# Patient Record
Sex: FEMALE | Race: WHITE | NOT HISPANIC OR LATINO | Employment: OTHER | URBAN - METROPOLITAN AREA
[De-identification: names, ages, dates, MRNs, and addresses within clinical notes are randomized per-mention and may not be internally consistent; named-entity substitution may affect disease eponyms.]

---

## 2017-02-01 ENCOUNTER — OFFICE VISIT - RIVER FALLS (OUTPATIENT)
Dept: FAMILY MEDICINE | Facility: CLINIC | Age: 57
End: 2017-02-01

## 2017-02-01 ASSESSMENT — MIFFLIN-ST. JEOR: SCORE: 1098.61

## 2017-04-20 ENCOUNTER — OFFICE VISIT - RIVER FALLS (OUTPATIENT)
Dept: FAMILY MEDICINE | Facility: CLINIC | Age: 57
End: 2017-04-20

## 2017-04-20 ASSESSMENT — MIFFLIN-ST. JEOR: SCORE: 1127.64

## 2017-05-16 ENCOUNTER — OFFICE VISIT - RIVER FALLS (OUTPATIENT)
Dept: FAMILY MEDICINE | Facility: CLINIC | Age: 57
End: 2017-05-16

## 2018-03-16 ENCOUNTER — OFFICE VISIT - RIVER FALLS (OUTPATIENT)
Dept: FAMILY MEDICINE | Facility: CLINIC | Age: 58
End: 2018-03-16

## 2019-04-17 ENCOUNTER — OFFICE VISIT - RIVER FALLS (OUTPATIENT)
Dept: FAMILY MEDICINE | Facility: CLINIC | Age: 59
End: 2019-04-17

## 2019-04-17 ASSESSMENT — MIFFLIN-ST. JEOR: SCORE: 1120.38

## 2019-04-25 ENCOUNTER — AMBULATORY - RIVER FALLS (OUTPATIENT)
Dept: FAMILY MEDICINE | Facility: CLINIC | Age: 59
End: 2019-04-25

## 2019-04-25 LAB — HEMOCCULT STL QL IA: NEGATIVE

## 2019-08-12 ENCOUNTER — OFFICE VISIT - RIVER FALLS (OUTPATIENT)
Dept: FAMILY MEDICINE | Facility: CLINIC | Age: 59
End: 2019-08-12

## 2019-08-12 ASSESSMENT — MIFFLIN-ST. JEOR: SCORE: 1101.33

## 2019-09-25 ENCOUNTER — OFFICE VISIT - RIVER FALLS (OUTPATIENT)
Dept: FAMILY MEDICINE | Facility: CLINIC | Age: 59
End: 2019-09-25

## 2020-06-02 ENCOUNTER — TRANSFERRED RECORDS (OUTPATIENT)
Dept: HEALTH INFORMATION MANAGEMENT | Facility: CLINIC | Age: 60
End: 2020-06-02

## 2020-11-30 ENCOUNTER — OFFICE VISIT - RIVER FALLS (OUTPATIENT)
Dept: FAMILY MEDICINE | Facility: CLINIC | Age: 60
End: 2020-11-30

## 2020-11-30 ASSESSMENT — MIFFLIN-ST. JEOR: SCORE: 1116.75

## 2020-12-01 ENCOUNTER — COMMUNICATION - RIVER FALLS (OUTPATIENT)
Dept: FAMILY MEDICINE | Facility: CLINIC | Age: 60
End: 2020-12-01

## 2020-12-01 LAB
CHOLEST SERPL-MCNC: 237 MG/DL
CHOLEST/HDLC SERPL: 2.7 {RATIO}
HDLC SERPL-MCNC: 87 MG/DL
LDLC SERPL CALC-MCNC: 135 MG/DL
NONHDLC SERPL-MCNC: 150 MG/DL
TRIGL SERPL-MCNC: 62 MG/DL

## 2020-12-02 ENCOUNTER — COMMUNICATION - RIVER FALLS (OUTPATIENT)
Dept: FAMILY MEDICINE | Facility: CLINIC | Age: 60
End: 2020-12-02

## 2020-12-02 LAB — HPV ABSTRACT: NORMAL

## 2020-12-16 ENCOUNTER — AMBULATORY - RIVER FALLS (OUTPATIENT)
Dept: FAMILY MEDICINE | Facility: CLINIC | Age: 60
End: 2020-12-16

## 2020-12-17 ENCOUNTER — COMMUNICATION - RIVER FALLS (OUTPATIENT)
Dept: FAMILY MEDICINE | Facility: CLINIC | Age: 60
End: 2020-12-17

## 2020-12-17 LAB — FECAL FAT, QUALITATIVE: NORMAL

## 2020-12-28 ENCOUNTER — COMMUNICATION - RIVER FALLS (OUTPATIENT)
Dept: FAMILY MEDICINE | Facility: CLINIC | Age: 60
End: 2020-12-28

## 2021-04-15 ENCOUNTER — AMBULATORY - RIVER FALLS (OUTPATIENT)
Dept: FAMILY MEDICINE | Facility: CLINIC | Age: 61
End: 2021-04-15

## 2021-05-13 ENCOUNTER — AMBULATORY - RIVER FALLS (OUTPATIENT)
Dept: FAMILY MEDICINE | Facility: CLINIC | Age: 61
End: 2021-05-13

## 2021-07-25 ENCOUNTER — NURSE TRIAGE (OUTPATIENT)
Dept: NURSING | Facility: CLINIC | Age: 61
End: 2021-07-25

## 2021-07-25 NOTE — TELEPHONE ENCOUNTER
Ear congestion and plugged feeling in the right ear noticed it 2 weeks ago and then went away. Yawning and swallowing does not help. Has seasonal allergies. Not tried any medication. NO drainage from the nose or ear. Stated she has past nasal drip. Scratchy throat comes and goes. NO fever. NO cough.  Home care suggestions reviewed with patient per RN protocol.   Advised to make an appointment with provider.        COVID 19 Nurse Triage Plan/Patient Instructions    Please be aware that novel coronavirus (COVID-19) may be circulating in the community. If you develop symptoms such as fever, cough, or SOB or if you have concerns about the presence of another infection including coronavirus (COVID-19), please contact your health care provider or visit [email or web address].     Disposition/Instructions    In-Person Visit with provider recommended. Reference Visit Selection Guide.    Thank you for taking steps to prevent the spread of this virus.  o Limit your contact with others.  o Wear a simple mask to cover your cough.  o Wash your hands well and often.    Resources    [facility]: About COVID-19: [email or web address]/    CDC: What to Do If You're Sick: www.cdc.gov/coronavirus/2019-ncov/about/steps-when-sick.html    CDC: Ending Home Isolation: www.cdc.gov/coronavirus/2019-ncov/hcp/disposition-in-home-patients.html     CDC: Caring for Someone: www.cdc.gov/coronavirus/2019-ncov/if-you-are-sick/care-for-someone.html     [facility]: Interim Guidance for Hospital Discharge to Home: www.health.[facility].mn.us/diseases/coronavirus/hcp/hospdischarge.pdf    [facility] clinical trials (COVID-19 research studies): clinicalaffairs.[facility]/um-clinical-trials     Below are the COVID-19 hotlines at the [place] Department of Health ([facility]). Interpreters are available.   o For health questions: Call 298-395-6376 or 1-535.627.2759 (7 a.m. to 7 p.m.)  o For questions about schools and childcare: Call  602.273.6455 or 1-524.171.3138 (7 a.m. to 7 p.m.)                   Advised an appointment to be seen within 3 days.    Lillian Bernal RN on 7/25/2021 at 9:48 AM      Reason for Disposition    Ear congestion present > 48 hours    Additional Information    Negative: Ear pain is main symptom    Negative: Hearing loss (complete or partial) is main symptom    Negative: Earwax is the main concern    Negative: [1] Has nasal allergies AND [2] they are acting up    Negative: Earache persists > 1 hour    Negative: Pus or cloudy discharge from ear canal    Protocols used: EAR - CONGESTION-A-AH

## 2021-07-26 ENCOUNTER — OFFICE VISIT - RIVER FALLS (OUTPATIENT)
Dept: FAMILY MEDICINE | Facility: CLINIC | Age: 61
End: 2021-07-26

## 2021-12-30 ENCOUNTER — RECORDS - RIVER FALLS (OUTPATIENT)
Dept: FAMILY MEDICINE | Facility: CLINIC | Age: 61
End: 2021-12-30

## 2022-01-26 ENCOUNTER — COMMUNICATION - RIVER FALLS (OUTPATIENT)
Dept: FAMILY MEDICINE | Facility: CLINIC | Age: 62
End: 2022-01-26
Payer: COMMERCIAL

## 2022-01-27 ENCOUNTER — OFFICE VISIT - RIVER FALLS (OUTPATIENT)
Dept: FAMILY MEDICINE | Facility: CLINIC | Age: 62
End: 2022-01-27
Payer: COMMERCIAL

## 2022-01-27 ENCOUNTER — AMBULATORY - RIVER FALLS (OUTPATIENT)
Dept: FAMILY MEDICINE | Facility: CLINIC | Age: 62
End: 2022-01-27
Payer: COMMERCIAL

## 2022-01-28 LAB — FECAL FAT, QUALITATIVE: NORMAL

## 2022-01-31 ENCOUNTER — COMMUNICATION - RIVER FALLS (OUTPATIENT)
Dept: FAMILY MEDICINE | Facility: CLINIC | Age: 62
End: 2022-01-31
Payer: COMMERCIAL

## 2022-02-12 VITALS
HEART RATE: 67 BPM | SYSTOLIC BLOOD PRESSURE: 126 MMHG | HEIGHT: 65 IN | DIASTOLIC BLOOD PRESSURE: 78 MMHG | DIASTOLIC BLOOD PRESSURE: 78 MMHG | SYSTOLIC BLOOD PRESSURE: 142 MMHG | BODY MASS INDEX: 20.89 KG/M2 | TEMPERATURE: 97 F | OXYGEN SATURATION: 97 % | WEIGHT: 125.4 LBS

## 2022-02-12 VITALS
WEIGHT: 123.2 LBS | OXYGEN SATURATION: 98 % | HEART RATE: 60 BPM | TEMPERATURE: 97.3 F | DIASTOLIC BLOOD PRESSURE: 74 MMHG | SYSTOLIC BLOOD PRESSURE: 128 MMHG

## 2022-02-12 VITALS
WEIGHT: 125.3 LBS | SYSTOLIC BLOOD PRESSURE: 134 MMHG | HEART RATE: 76 BPM | DIASTOLIC BLOOD PRESSURE: 68 MMHG | TEMPERATURE: 98.2 F

## 2022-02-12 VITALS
WEIGHT: 121.4 LBS | BODY MASS INDEX: 20.23 KG/M2 | SYSTOLIC BLOOD PRESSURE: 138 MMHG | HEIGHT: 65 IN | TEMPERATURE: 102 F | OXYGEN SATURATION: 95 % | DIASTOLIC BLOOD PRESSURE: 82 MMHG | HEART RATE: 92 BPM

## 2022-02-12 VITALS
HEART RATE: 60 BPM | HEIGHT: 65 IN | WEIGHT: 126.2 LBS | BODY MASS INDEX: 21.02 KG/M2 | TEMPERATURE: 97.2 F | SYSTOLIC BLOOD PRESSURE: 102 MMHG | DIASTOLIC BLOOD PRESSURE: 60 MMHG

## 2022-02-12 VITALS
DIASTOLIC BLOOD PRESSURE: 64 MMHG | HEIGHT: 65 IN | BODY MASS INDEX: 21.29 KG/M2 | HEART RATE: 60 BPM | WEIGHT: 127.8 LBS | TEMPERATURE: 97.6 F | SYSTOLIC BLOOD PRESSURE: 112 MMHG

## 2022-02-12 VITALS
HEIGHT: 65 IN | WEIGHT: 122 LBS | DIASTOLIC BLOOD PRESSURE: 74 MMHG | SYSTOLIC BLOOD PRESSURE: 118 MMHG | HEART RATE: 64 BPM | TEMPERATURE: 97.5 F | BODY MASS INDEX: 20.33 KG/M2

## 2022-02-15 NOTE — TELEPHONE ENCOUNTER
---------------------  From: Eliz Lamas MD   Sent: 12/7/2020 4:50:24 PM CST  Subject: lymph node biopsy results     Called patient at 444pm. Lymph node biopsy from axilla was benign. Reassuring findings. She should call back if questions.

## 2022-02-15 NOTE — NURSING NOTE
Comprehensive Intake Entered On:  8/12/2019 3:03 PM CDT    Performed On:  8/12/2019 3:01 PM CDT by Reta Tran CMA               Summary   Chief Complaint :   c/o fatigue, loss of voice, slight sore throat, mild cough x 4-5 days   Menstrual Status :   Postmenopausal   Weight Measured :   122 lb(Converted to: 122 lb 0 oz, 55.34 kg)    Height Measured :   64.5 in(Converted to: 5 ft 4 in, 163.83 cm)    Body Mass Index :   20.62 kg/m2   Body Surface Area :   1.59 m2   Systolic Blood Pressure :   118 mmHg   Diastolic Blood Pressure :   74 mmHg   Mean Arterial Pressure :   89 mmHg   Peripheral Pulse Rate :   64 bpm   BP Site :   Right arm   Pulse Site :   Radial artery   BP Method :   Manual   HR Method :   Manual   Temperature Temporal :   97.5 DegF(Converted to: 36.4 DegC)    Race :      Languages :   English   Ethnicity :   Not  or    Reta Tran CMA - 8/12/2019 3:01 PM CDT   Health Status   Allergies Verified? :   Yes   Medication History Verified? :   Yes   Immunizations Current :   Yes   Medical History Verified? :   Yes   Pre-Visit Planning Status :   Completed   Tobacco Use? :   Never smoker   Reta Tran CMA - 8/12/2019 3:01 PM CDT   Consents   Consent for Immunization Exchange :   Consent Granted   Consent for Immunizations to Providers :   Consent Granted   Reta Tran CMA - 8/12/2019 3:01 PM CDT   Meds / Allergies   (As Of: 8/12/2019 3:03:46 PM CDT)   Allergies (Active)   No Known Medication Allergies  Estimated Onset Date:   Unspecified ; Created By:   Ximena Prescott MA; Reaction Status:   Active ; Category:   Drug ; Substance:   No Known Medication Allergies ; Type:   Allergy ; Updated By:   Ximena Prescott MA; Reviewed Date:   8/12/2019 3:02 PM CDT        Medication List   (As Of: 8/12/2019 3:03:46 PM CDT)   No Known Home Medications     Reta Tran CMA - 8/12/2019 3:02:32 PM

## 2022-02-15 NOTE — NURSING NOTE
Comprehensive Intake Entered On:  11/30/2020 9:09 AM CST    Performed On:  11/30/2020 9:06 AM CST by Iens Flor CMA               Summary   Chief Complaint :   annual exam- preventative handout reviewed   Menstrual Status :   Postmenopausal   Weight Measured :   125.4 lb(Converted to: 125 lb 6 oz, 56.880 kg)    Height Measured :   64.5 in(Converted to: 5 ft 4 in, 163.83 cm)    Body Mass Index :   21.19 kg/m2   Body Surface Area :   1.61 m2   Systolic Blood Pressure :   142 mmHg (HI)    Diastolic Blood Pressure :   78 mmHg   Mean Arterial Pressure :   99 mmHg   Peripheral Pulse Rate :   67 bpm   Temperature Tympanic :   97 DegF(Converted to: 36.1 DegC)  (LOW)    Oxygen Saturation :   97 %   Race :      Languages :   English   Ethnicity :   Not  or    Ines Flor CMA - 11/30/2020 9:06 AM CST   Health Status   Allergies Verified? :   Yes   Medication History Verified? :   Yes   Immunizations Current :   Yes   Medical History Verified? :   Yes   Pre-Visit Planning Status :   Completed   Tobacco Use? :   Never smoker   Ines Flor CMA - 11/30/2020 9:06 AM CST   ID Risk Screen   Recent Travel History :   No recent travel   Family Member Travel History :   No recent travel   Other Exposure to Infectious Disease :   Unknown   Ines Flor CMA - 11/30/2020 9:06 AM CST   Social History   Social History   (As Of: 11/30/2020 9:09:50 AM CST)   Alcohol:  Current      Occasional   (Last Updated: 12/1/2010 1:43:53 PM CST by Izzy Calle)   1-2 times per month, 2 drinks/episode average.   (Last Updated: 4/20/2017 10:51:07 AM CDT by Shanda Leon CMA)          Tobacco:  Denies Tobacco Use      Never smoker   (Last Updated: 6/9/2017 11:54:19 AM CDT by Izzy Calle)   Never (less than 100 in lifetime)   (Last Updated: 11/30/2020 9:06:43 AM CST by Ines Flor CMA)          Electronic Cigarette/Vaping:        Electronic Cigarette Use: Never.   (Last Updated: 11/30/2020 9:06:47 AM CST by Chacho SNYDER  Ines)          Substance Abuse:  Denies Substance Abuse      Never   (Last Updated: 6/9/2017 11:54:24 AM CDT by Izzy Calle)          Employment/School:        Employed   Comments:  5/20/2011 12:19 PM - [employer]    (Last Updated: 5/20/2011 12:19:36 PM CDT by Shelby Shields)          Home/Environment:        Marital status: .  Spouse/Partner name: Jed Bauman.   (Last Updated: 12/17/2013 2:34:01 PM CST by Lima Lazo CMA)          Nutrition/Health:        Type of diet: Regular.   (Last Updated: 1/20/2016 2:14:51 PM CST by Stephanie Alfonso LPN)          Exercise:  Regular exercise      (Last Updated: 1/20/2016 2:15:00 PM CST by Stephanie Alfonso LPN )         Sexual:        Sexually active: Yes.  Sexual orientation: Heterosexual.   (Last Updated: 6/9/2017 11:54:03 AM CDT by Izzy Calle)          Other:        First menses age 11.  Irregular menses.   (Last Updated: 6/9/2017 11:53:29 AM CDT by Izzy Calle)

## 2022-02-15 NOTE — LETTER
(Inserted Image. Unable to display)   March 27, 2019      PHILIPPE ERNST   [place], [place] [id number]        Dear PHILIPPE,      Thank you for selecting [facility] (previously [facility]) for your healthcare needs.     Our records indicate you are due for the following services:     Annual Physical    To schedule an appointment or if you have further questions, please contact your primary clinic:   [facility]          [phone]   [facility]    [phone]             [facility]         [phone]      Powered by [employer]    Sincerely,    Eliz Lamas M.D.

## 2022-02-15 NOTE — TELEPHONE ENCOUNTER
---------------------  From: Akshat Castrejon   To: Jarrod Pascual PA-C;     Sent: 9/25/2019 2:40:31 PM CDT  Subject: scheduling management     Cat called, foot feeling better is going to cancel appt w you this afternoon.---------------------  From: Jarrod Pascual PA-C   To: Akshat Castrejon;     Sent: 9/25/2019 2:42:13 PM CDT  Subject: RE: scheduling management     [place]    K[facility]

## 2022-02-15 NOTE — PROGRESS NOTES
Patient:   PHILIPPE BOYD            MRN: [id number]            FIN: [id number]               Age:   57 years     Sex:  Female     :  1960   Associated Diagnoses:   Fatigue   Author:   Eliz Lamas MD      Chief Complaint   3/16/2018 1:26 PM CDT    c/o Fatigue x 1 day, concerned about flu  (Modified)      History of Present Illness   Patient with fatigue, feeling run down. Had to nap yesterday.  No known fevers. No congestion or cough. Not nauseous.  Felt like this last year with influenza, although did have fever.  Requesting testing for flu      Review of Systems   Constitutional:  Negative except as documented in history of present illness.    Respiratory:  Negative.    Cardiovascular:  Negative.    Gastrointestinal:  Negative.       Health Status   Allergies:    Allergic Reactions (All)  No known allergies   Problem list:    All Problems  Menarche / ICD-9-CM V21.8 / Confirmed      Histories   Family History:    Kidney stone  Mother  Hypertension  Mother  Heart disease  Mother  Comments:  12/10/2013 3:07 PM - Karen Haskins  Onset late 50's or early 60's. Had diabetes many yrs before.  Diabetes mellitus type 2  Mother  Myocardial infarction  Grandmother (M)  Father  Smoker  Brother  Angioplasty  Brother: onset at 42 .  Cancer of prostate  Father  Esophageal tear  Father  MI (myocardial infarction).  Father  Comments:  12/10/2013 3:07 PM - Karen Haskins  Late 50's or early 60's.     Procedure history:    HPV - Human papillomavirus test negative ([id number]) on 3/17/2010 at 49 Years.  Comments:  2011 2:56 PM - Karen Haskins  Low risk for cervical cancer. OK to have pap q 3 yrs.  Excision of epidermal cyst on 10/21/1991 at 31 Years.   ([id number]).      Physical Examination   Vital Signs   3/16/2018 1:26 PM CDT Temperature Tympanic 97.3 DegF  LOW    Peripheral Pulse Rate 60 bpm    Pulse Site Radial artery    HR Method Manual    Systolic Blood Pressure 128 mmHg     Diastolic Blood Pressure 74 mmHg    Mean Arterial Pressure 92 mmHg    BP Site Left arm    BP Method Manual    Oxygen Saturation 98 %      Measurements from flowsheet : Measurements   3/16/2018 1:26 PM CDT    Weight Measured - Standard                123.2 lb     General:  Alert and oriented, No acute distress.    Eye:  Pupils are equal, round and reactive to light, Normal conjunctiva.    HENT:  Normocephalic, Tympanic membranes are clear, Oral mucosa is moist, No pharyngeal erythema, No sinus tenderness.    Neck:  Supple, Non-tender, No lymphadenopathy.    Respiratory:  Lungs are clear to auscultation.    Cardiovascular:  Normal rate, Regular rhythm.       Review / Management   Results review:  Lab results   3/16/2018 2:00 PM CDT Influenza A POC Negative    Influenza B POC Negative    POC Test Comments POC Test Comments   .       Impression and Plan   Diagnosis     Fatigue (ZNZ26-TE R53.83).     Course:  Improving.    Plan:  Reassurance provided. Patient feeling better today. Follow up if symptoms recur or if new concerns.

## 2022-02-15 NOTE — PROGRESS NOTES
Patient:   PHILIPPE BOYD            MRN: [id number]            FIN: [id number]               Age:   56 years     Sex:  Female     :  1960   Associated Diagnoses:   Fever; Cough; Influenza A   Author:   Jarrod Pascual PA-C      Visit Information      Date of Service: 2017 12:51 pm  Performing Location: [facility]  Encounter#: [id number]      Primary Care Provider (PCP):  Eliz Lamas MD    NPI# [id number]   Visit type:  New symptom.    Accompanied by:  Spouse.    Source of history:  Self, Medical record.    History limitation:  None.       Chief Complaint   2017 12:57 PM CST    fever, productive cough, HA's, weak x 3 days      History of Present Illness             The patient presents with cough.  The cough is described as productive green sputum.  The severity of the cough is moderate.  The cough is constant.  The cough has lasted for 3 day(s).  The context of the cough: occurred in association with illness.  Associated symptoms consist of chills, fever, Headache. myalgias and denies shortness of breath.        Review of Systems   Constitutional:  Fever, Chills, Fatigue.    Eye:  Negative.    Ear/Nose/Mouth/Throat:  Negative except as documented in history of present illness.    Respiratory:  Negative except as documented in history of present illness.       Health Status   Allergies:    Allergic Reactions (All)  No known allergies   Medications:  (Selected)      Problem list:    All Problems  Menarche / ICD-9-CM V21.8 / Confirmed  Inactive: Ganglion Cyst of Wrist / ICD-9-.41  Canceled: Amenorrhea / ICD-9-.0      Histories   Past Medical History:    Active  Menarche (V21.8): Onset in  at 12 years.   Family History:    Kidney stone  Mother  Hypertension  Mother  Heart disease  Mother  Comments:  12/10/2013 3:07 PM - Karen Haskins  Onset late 50's or early 60's. Had diabetes many yrs before.  Diabetes mellitus type 2  Mother  Myocardial  infarction  Grandmother (M)  Father  Smoker  Brother  Angioplasty  Brother: onset at 42 .  Cancer of prostate  Father  Esophageal tear  Father  MI (myocardial infarction).  Father  Comments:  12/10/2013 3:07 PM - Karen Haskins  Late 50's or early 60's.     Procedure history:    HPV - Human papillomavirus test negative ([id number]) on 3/17/2010 at 49 Years.  Comments:  2011 2:56 PM - Karen Haskins  Low risk for cervical cancer. OK to have pap q 3 yrs.  Excision of epidermal cyst on 10/21/1991 at 31 Years.   ([id number]).   Social History:        Alcohol Assessment: Current            Occasional      Tobacco Assessment: Denies Tobacco Use      Employment and Education Assessment            Employed                     Comments:                      2011 - [employer]                     [employer] with       Home and Environment Assessment            Marital status: .  Spouse/Partner name: Jed Bauman.      Nutrition and Health Assessment            Type of diet: Regular.      Exercise and Physical Activity Assessment: Regular exercise      Sexual Assessment            Sexual orientation: Heterosexual.        Physical Examination   Vital Signs   2017 12:57 PM CST Temperature Tympanic 102.0 DegF  HI    Peripheral Pulse Rate 92 bpm    HR Method Electronic    Systolic Blood Pressure 138 mmHg    Diastolic Blood Pressure 82 mmHg    Mean Arterial Pressure 101 mmHg    BP Site Right arm    BP Method Manual    Oxygen Saturation 95 %      Measurements from flowsheet : Measurements   2017 12:57 PM CST Height Measured - Standard 64.5 in    Weight Measured - Standard 121.4 lb    BSA 1.58 m2    Body Mass Index 20.51 kg/m2      General:  Alert and oriented, No acute distress.    Eye:  Pupils are equal, round and reactive to light, Extraocular movements are intact, Normal conjunctiva.    HENT:  Normocephalic, Oral mucosa is moist, No pharyngeal erythema.    Neck:  Supple,  Non-tender, No lymphadenopathy.    Respiratory:  Lungs are clear to auscultation, Respirations are non-labored, Breath sounds are equal.    Cardiovascular:  Normal rate, Regular rhythm, No murmur.    Psychiatric:  Cooperative, Appropriate mood & affect.       Review / Management   Results review:  Lab results   2/1/2017 1:25 PM CST Influenza A POC Positive    Influenza B POC Negative   .       Impression and Plan   Diagnosis     Fever (SWY72-AL R50.9).     Cough (VTL03-CU R05).     Influenza A (QCN07-NL J10.1).     Patient Instructions:       Counseled: Patient, Spouse, Regarding diagnosis, Regarding treatment, Regarding medications, Regarding diet, Regarding activity, Verbalized understanding.    Orders     Orders (Selected)   Prescriptions  Prescribed  Tamiflu 75 mg oral capsule: 1 cap(s) ( 75 mg ), PO, BID, x 5 day(s), # 10 cap(s), 0 Refill(s), Type: Acute, Pharmacy: [facility], 1 cap(s) po bid,x5 day(s).     Take medicine as prescribed, side effects discussed.  Tylenol/ibuprofen for fever and discomfort.  Push fluids.  RTC if not improving in 36-48 hours, prior if concerns as we have discussed.

## 2022-02-15 NOTE — NURSING NOTE
Fecal Immunochemistry Test (FIT) POC Entered On:  4/25/2019 10:17 AM CDT    Performed On:  4/25/2019 10:16 AM CDT by Reta Tran CMA               FIT POC   FIT Fecal Occult Blood :   Negative   POC Test Comments :   FWD to KWReta Boykin CMA - 4/25/2019 10:16 AM CDT   Details (FIT)   FIT Collection Date :   4/25/2019 8:00 AM CDT   FIT Handling Specimen POC :   Stool   FIT POC Test Comments :   Lab Test Performed by:   [facility]  [address] [place] [address]  Phone: [phone]  Fax: [phone]     Reta Tran CMA - 4/25/2019 10:16 AM CDT

## 2022-02-15 NOTE — PROGRESS NOTES
Patient:   PHILIPPE BOYD            MRN: [id number]            FIN: [id number]               Age:   57 years     Sex:  Female     :  1960   Associated Diagnoses:   Well adult exam   Author:   Eliz Lamas MD      Visit Information   Visit type:  Annual exam.    Source of history:  Self.    History limitation:  None.       Chief Complaint   2017 10:09 AM CDT   annual px      Well Adult History   Well Adult History             The patient presents for well adult exam.  The patient's general health status is described as good.  The patient's diet is described as balanced.  Exercise: active, farmer - .  Associated symptoms consist of none.  Last menstrual period: postmenopausal, last period about 5 years ago.  Medical encounters: none.        Review of Systems   Constitutional:  Fever, Chills, Fatigue, had influenza A in February.    Ear/Nose/Mouth/Throat:  phlegm in throat that is chronic, present daily, worst later in day; has had sore throat intermittently past few days; occasional hoarseness.    All other systems reviewed and negative      Health Status   Allergies:    Allergic Reactions (Selected)  No known allergies   Medications:  (Selected)      Problem list:    All Problems  Menarche / ICD-9-CM V21.8 / Confirmed  Inactive: Ganglion Cyst of Wrist / ICD-9-.41  Canceled: Amenorrhea / ICD-9-.0      Histories   Past Medical History:    Active  Menarche (ICD-9-CM V21.8): Onset in  at 12 years.   Family History:    Kidney stone  Mother  Hypertension  Mother  Heart disease  Mother  Comments:  12/10/2013 3:07 PM - Karen Haskins  Onset late 50's or early 60's. Had diabetes many yrs before.  Diabetes mellitus type 2  Mother  Myocardial infarction  Grandmother (M)  Father  Smoker  Brother  Angioplasty  Brother: onset at 42 .  Cancer of prostate  Father  Esophageal tear  Father  MI (myocardial infarction).  Father  Comments:  12/10/2013 3:07 PM - Alessio HUTCHINSON  Karen  Late 50's or early 60's.     Procedure history:    HPV - Human papillomavirus test negative ([id number]) on 3/17/2010 at 49 Years.  Comments:  2011 2:56 PM - Karen Haskins  Low risk for cervical cancer. OK to have pap q 3 yrs.  Excision of epidermal cyst on 10/21/1991 at 31 Years.   ([id number]).   Social History:        Alcohol Assessment: Current            Occasional      Tobacco Assessment: Denies Tobacco Use      Employment and Education Assessment            Employed                     Comments:                      2011 - [employer]s                     [employer] with       Home and Environment Assessment            Marital status: .  Spouse/Partner name: Jed Bauman.      Nutrition and Health Assessment            Type of diet: Regular.      Exercise and Physical Activity Assessment: Regular exercise      Sexual Assessment            Sexual orientation: Heterosexual.        Physical Examination   Vital Signs   2017 10:09 AM CDT Temperature Temporal 97.6 DegF    Peripheral Pulse Rate 60 bpm    Pulse Site Radial artery    Systolic Blood Pressure 112 mmHg    Diastolic Blood Pressure 64 mmHg    Mean Arterial Pressure 80 mmHg    BP Site Right arm    BP Method Manual      Measurements from flowsheet : Measurements   2017 10:09 AM CDT Height Measured - Standard 64.5 in    Weight Measured - Standard 127.8 lb    BSA 1.62 m2    Body Mass Index 21.6 kg/m2      General:  Alert and oriented, No acute distress.    Eye:  Pupils are equal, round and reactive to light, Extraocular movements are intact, Normal conjunctiva.    HENT:  Normocephalic, Tympanic membranes are clear, Oral mucosa is moist, No pharyngeal erythema.    Neck:  Supple, Non-tender, No lymphadenopathy, No thyromegaly.    Respiratory:  Lungs are clear to auscultation.    Cardiovascular:  Normal rate, Regular rhythm.    Breast:  No mass, No tenderness, No discharge.    Gastrointestinal:  Soft,  Non-tender, Non-distended, No organomegaly.    Musculoskeletal:  Normal range of motion, Normal strength.    Integumentary:  Warm, Dry, Pink, No rash, no concerning lesions.    Neurologic:  Alert, Oriented, Normal sensory.    Psychiatric:  Cooperative, Appropriate mood & affect.       Impression and Plan   Diagnosis     Well adult exam (DNP91-BU Z00.00).     Course:  Progressing as expected.    Patient Instructions:       Counseled: Patient, BMI, diet, and exercise.    Orders     Reviewed preventive screening. Recommended mammogram but patient not certain she wants to do annually. Last mammogram 2016. Discussed colon cancer screening. Will do stool for blood..     Discussed congestion in throat. Thinks it is allergies. Will try otc antihistamines. If worsening or not resolving, consider visit with ENT..

## 2022-02-15 NOTE — LETTER
(Inserted Image. Unable to display)   [address] [place] [address]   [phone]  December 01, 2020      PHILIPPE ERNST       [place], [place] [id number]      Dear PHILIPPE,    Thank you for selecting [facility] for your healthcare needs. Below you will find the results of the recent tests done at our clinic.     Your lab results are listed below. Overall these are good. Your HDL (good) cholesterol is high and your triglycerides are low. LDL (bad) cholesterol is a little higher than ideal but overall these are good. We'll plan to repeat again in 3 years.     Result Name Current Result Reference Range   Cholesterol (mg/dL) ((H)) 237 11/30/2020  - <200   Non-HDL Cholesterol ((H)) 150 11/30/2020  - <130   HDL (mg/dL)  87 11/30/2020 > OR = 50 -    Cholesterol/HDL Ratio  2.7 11/30/2020  - <5.0   LDL ((H)) 135 11/30/2020    Triglyceride (mg/dL)  62 11/30/2020  - <150       Please contact me or my assistant at [phone] if you have any questions or concerns.     Sincerely,        Eliz Lamas M.D.

## 2022-02-15 NOTE — TELEPHONE ENCOUNTER
---------------------  From: Collette Gutierrez CMA   To: Phone Messages Pool ([id number] - Teresa);     Sent: 1/27/2020 1:10:43 PM CST  Subject: Mammo/Annual FYI     PCP:   RANDALL      Time of Call:  1217       Person Calling:  Patient  Phone number:  [phone]    Returned call at: [phone]    Note:   Patient called asking when she is due for annual exam, mammo and fecal testing. Informed patient of needing an annual in April, mammo now as she is overdue per RTC and fecal at time of visit. She understood and will be scheduling mammo @ [facility].    Last office visit and reason:  8/12/19 Cough w/ KAH

## 2022-02-15 NOTE — PROCEDURES
Accession Number:       [id number]  CLINICAL INFORMATION::     None given  LMP::     NONE GIVEN  PREV. PAP::     NONE GIVEN  PREV. BX::     NONE GIVEN  SOURCE::     None given  STATEMENT OF ADEQUACY::     Satisfactory for evaluation. Endocervical/transformation zone component absent.  INTERPRETATION/RESULT::     Negative for intraepithelial lesion or malignancy. Atrophic pattern; predominantly parabasal cells  COMMENT::     This Pap test has been evaluated with computer assisted technology.  CYTOTECHNOLOGIST::     RAMONITA SANTOS(ASCP) CT Screening location: [place] [id number]  COMMENT:     See comment       EXPLANATORY NOTE:         The Pap is a screening test for cervical cancer. It is       not a diagnostic test and is subject to false negative       and false positive results. It is most reliable when a       satisfactory sample, regularly obtained, is submitted       with relevant clinical findings and history, and when       the Pap result is evaluated along with historic and       current clinical information.  HPV mRNA E6/E7:     Not Detected       This test was performed using the APTIMA HPV Assay ([employer]       This assay detects E6/E7 viral messenger RNA (mRNA) from 14       high-risk HPV types (16,18,31,33,35,39,45,51,52,56,58,59,66,68).         The analytical performance characteristics of       this assay have been determined by [employer]. The modifications have not been       cleared or approved by the FDA. This assay has       been validated pursuant to the CLIA regulations       and is used for clinical purposes.

## 2022-02-15 NOTE — TELEPHONE ENCOUNTER
---------------------  From: Eliz Lamas MD   To: Phone [employer] ([id number]_[place] - [place]);     Sent: 2/10/2020 2:19:51 PM CST  Subject: General Message     Please call patient. She dropped off xrays of her hips from the chiropractor. I was looking back in her chart but don't see we have discussed symptoms. I'm not sure what symptoms she is having. Xrays by my read show degenerative changes in the spine, mild degenerative changes in the hips, and a moderate amount of stool.  It appears she wants to see ortho, which is okay with me given that she is not improving with conservative care through her chiropractor, but I wasn't sure what to put as the reason for the referral.---------------------  From: Reta Tran CMA (Phone Messages Pool ([id number]_[place] - [place]))   To: Eliz Lamas MD;     Sent: 2/10/2020 3:30:44 PM CST  Subject: RE: General Message     Returned Call  Time: 3:28 pm  Note:  Called & spoke with patient. States that she has been having a lot of pain in her left hip and down through her groin. Right hip occasionally has pain as well. States that she had her X-Rays taken at [facility] and Dr. Arik Hernandez reviewed. I advised pt to  X-Ray to bring with her to see Ortho. I will leave at . Advised that someone would be contacting her about an apt. No further questions.  ** Submitted: **  Order:Referral (Request)  Details:  2/10/2020 3:49 PM CST, Referred to: Orthopaedics, Bilateral hip pain         Signed by Eliz Lamas MD  2/10/2020 3:49:00 PM---------------------  From: Eliz Lamas MD   To: Phone [employer] ([id number]_[place] Joselin Moore);     Sent: 2/10/2020 3:49:33 PM CST  Subject: RE: General MessageNoted.

## 2022-02-15 NOTE — NURSING NOTE
Depression Screening Entered On:  4/17/2019 10:50 AM CDT    Performed On:  4/17/2019 10:50 AM CDT by Deanna Colin CMA               Depression Screening   Little Interest - Pleasure in Activities :   Not at all   Feeling Down, Depressed, Hopeless :   Not at all   Initial Depression Screen Score :   0    Trouble Falling or Staying Asleep :   Not at all   Feeling Tired or Little Energy :   Not at all   Poor Appetite or Overeating :   Not at all   Feeling Bad About Yourself :   Not at all   Trouble Concentrating :   Not at all   Moving or Speaking Slowly :   Not at all   Thoughts Better Off Dead or Hurting Self :   Not at all   Detailed Depression Screen Score :   0    Total Depression Screen Score :   0    Deanna Colin CMA - 4/17/2019 10:50 AM CDT

## 2022-02-15 NOTE — LETTER
(Inserted Image. Unable to display)   June 13, 2019      PHILIPPE ERNST   [place], [place] [id number]        Dear PHILIPPE,      Thank you for selecting [facility] ([facility] [place] [place] [facility] for your healthcare needs.     Our records indicate you are due for the following services:     Your provider has recommended you have the preventative service for a SCREENING MAMMOGRAM.  Please schedule at your earliest convenience.    [facility] are dedicated to providing excellent care that is most cost effective for our patients.  *Please contact your insurance company regarding approved providers*    [facility]:  [phone]   [facility]:   [phone]    [facility]     [phone]   [facility]    [facility]    [phone]  [facility])  [phone]  [facility]   [phone]  [facility]     [phone]       [facility])      [phone]        Powered by [employer]    Sincerely,    Eliz Lamas M.D.

## 2022-02-15 NOTE — NURSING NOTE
"Comprehensive Intake Entered On:  7/26/2021 10:06 AM CDT    Performed On:  7/26/2021 10:01 AM CDT by Antonieta Lindsay CMA               Summary   Chief Complaint :   Bilateral ear \"stuffiness\".    Menstrual Status :   Postmenopausal   Weight Measured :   125.3 lb(Converted to: 125 lb 5 oz, 56.835 kg)    Systolic Blood Pressure :   134 mmHg (HI)    Diastolic Blood Pressure :   68 mmHg   Mean Arterial Pressure :   90 mmHg   Peripheral Pulse Rate :   76 bpm   BP Site :   Right arm   Pulse Site :   Radial artery   BP Method :   Manual   HR Method :   Manual   Temperature Tympanic :   98.2 DegF(Converted to: 36.8 DegC)    Race :      Languages :   English   Ethnicity :   Not  or    Antonieta Lindsay CMA - 7/26/2021 10:01 AM CDT   Health Status   Allergies Verified? :   Yes   Medication History Verified? :   Yes   Immunizations Current :   Yes   Pre-Visit Planning Status :   Not completed   Tobacco Use? :   Never smoker   Antonieta Lindsay CMA - 7/26/2021 10:01 AM CDT   Meds / Allergies   (As Of: 7/26/2021 10:06:10 AM CDT)   Allergies (Active)   No Known Medication Allergies  Estimated Onset Date:   Unspecified ; Created By:   Ximena Bhat; Reaction Status:   Active ; Category:   Drug ; Substance:   No Known Medication Allergies ; Type:   Allergy ; Updated By:   Ximena Bhat; Reviewed Date:   8/12/2019 3:02 PM CDT        Medication List   (As Of: 7/26/2021 10:06:10 AM CDT)   Home Meds    fluoride topical  :   fluoride topical ; Status:   Documented ; Ordered As Mnemonic:   PreviDent 0.2% mucous membrane solution ; Simple Display Line:   0 Refill(s) ; Catalog Code:   fluoride topical ; Order Dt/Tm:   11/30/2020 8:59:34 AM CST ; Comment:   Responsible Provider: SALLIE FERGUSON          latanoprost ophthalmic  :   latanoprost ophthalmic ; Status:   Documented ; Ordered As Mnemonic:   latanoprost 0.005% ophthalmic solution ; Simple Display Line:   1 drop(s), Eye-Both, qpm, 0 Refill(s) ; Catalog " Code:   latanoprost ophthalmic ; Order Dt/Tm:   11/30/2020 9:05:09 AM CST          Miscellaneous Prescription  :   Miscellaneous Prescription ; Status:   Documented ; Ordered As Mnemonic:   immune limitless anti-inflammatory ; Simple Display Line:   Oral, daily, 0 Refill(s) ; Catalog Code:   Miscellaneous Prescription ; Order Dt/Tm:   7/26/2021 10:04:05 AM CDT          multivitamin with minerals  :   multivitamin with minerals ; Status:   Documented ; Ordered As Mnemonic:   Daily Multi oral tablet ; Simple Display Line:   Oral, daily, 0 Refill(s) ; Catalog Code:   multivitamin with minerals ; Order Dt/Tm:   7/26/2021 10:03:55 AM CDT

## 2022-02-15 NOTE — LETTER
(Inserted Image. Unable to display)   [address] [facility] [address]  December 07, 2020        PHILIPPE ARIAS ERNST   [place], [place] [id number]      Dear PHILIPPE,      Thank you for selecting [facility] for your [facility] needs.      As we discussed by phone, the biopsy results for the lymph node were normal - benign. Mammogram was also normal.    This letter is to inform you that we have received a copy of your mammogram results.  Your results were normal.  You will also receive notice of your results from the radiologist within 7-10 days.  This letter is to let you know I have also received a copy and it is filed in your clinic chart.      Please plan on having your next mammogram done in 12 months.       Please contact me or my assistant at [phone] if you have any questions or concerns.     Sincerely,      Eliz Lamas MD

## 2022-02-15 NOTE — TELEPHONE ENCOUNTER
---------------------  From: Emily Crespo CMA (Phone Messages Pool ([id number]_[place] - [place]))   To: [facility] Message Pool ([id number]_[facility]);     Sent: 12/28/2020 10:24:13 AM CST  Subject: General Message     Phone Message    PCP:   RANDALL      Time of Call:  0938       Person Calling:  Pt  Phone number:  [phone]    Returned call at: 1010    Note:   Calls with questions about her physical. Return call read her result letters and answer the questions. She adds the last 20 years she has had occasional pressure in her chest, never with activity, and after burping she feels better. She would like to know if [facility] needs to see her for this or should she monitor from home?    Last office visit and reason:  11/30/20 Annual physical [facility]---------------------  From: Jennie Pollard CMA ([employer] Message Pool ([id number]_[facility]))   To: Eliz Lamas MD;     Sent: 12/28/2020 10:28:31 AM CST  Subject: FW: General MessageIf it is unchanged over 20 years, not with activity, and relieves with belching, it is most likely acid reflux/GERD related. If she is noticing any changes like it is more severe, more frequent, or triggers are changing, I am happy to see her at any time.---------------------  From: Eliz Lamas MD   To: [employer] Message Pool ([id number]_[facility]);     Sent: 12/28/2020 10:41:14 AM CST  Subject: RE: General MessageI called and spoke to pt-we discussed food to avoid w/ GERD-she notices this after eating cookies (which she has eaten more lately d/t demetri). She has tried OTC ant-acid which does help. She will schedule if further questions/concerns.

## 2022-02-15 NOTE — LETTER
(Inserted Image. Unable to display)   December 22, 2021  [place] [address]        Dear PHILIPPE,    Thank you for selecting [facility] for your healthcare needs.    Our records indicate you are due for the following services:     Colon Cancer Screening Stool Cards ~ Stool cards were sent home with you within the past 3 months and have not been returned to us for testing. You may either drop off your stool cards at your clinic, or send them to us in the mail.     (FYI   Regarding office visits: In some instances, a video visit or telephone visit may be offered as an option.)    To schedule an appointment or if you have further questions, please contact your clinic at [phone].    Powered by [employer]    Sincerely,    Eliz Lamas MD

## 2022-02-15 NOTE — LETTER
(Inserted Image. Unable to display)   [address] [place] [address]   December 02, 2020        PHILIPPE ERNST       [place], [place] [id number]      Dear PHILIPPE,    Thank you for selecting [facility] for your healthcare needs. Below you will find the results of the recent tests done at our clinic.     Normal pap smear with negative HPV.  The pap can be repeated every five years unless there is concern about increased risk. We still will want to see you once a year for an annual exam.    Result Name Current Result   ThinPrep PAP with HPV   11/30/2020       Please contact me or my assistant at [phone] if you have any questions or concerns.     Sincerely,        Eliz Lamas M.D.

## 2022-02-15 NOTE — TELEPHONE ENCOUNTER
---------------------  From: Chacho SNYDER Ines   Sent: 12/8/2020 11:34:09 AM CST  Subject: General Message-chol/BP     Phone Message    PCP:   RANDALL      Time of Call:  _1020       Person Calling:  self  Phone number:  [phone]    Returned call at: [phone]    Note:   pt asking about her Chol and what she can do to help with her numbers.  We discussed diet/exercise.  Offered to send out a handout and she agrees to this-mailed  also, asking about elevated BP in clinic at 142/78 - we talked about having it rechecked at anytime in clinic and she will stop in to have this done    Questions answered

## 2022-02-15 NOTE — LETTER
(Inserted Image. Unable to display)   December 01, 2021  [place] [address]        Dear PHILIPPE,    Thank you for selecting [facility] for your healthcare needs.    Our records indicate you are due for the following services:     Annual Physical     (FYI   Regarding office visits: In some instances, a video visit or telephone visit may be offered as an option.)    To schedule an appointment or if you have further questions, please contact your clinic at [phone].    Powered by [employer]    Sincerely,    Eliz Lamas MD

## 2022-02-15 NOTE — PROGRESS NOTES
Patient:   PHILIPPE BOYD            MRN: [id number]            FIN: [id number]               Age:   59 years     Sex:  Female     :  1960   Associated Diagnoses:   Well adult exam   Author:   Eliz Lamas MD      Visit Information   Visit type:  Annual exam.    Source of history:  Self.    History limitation:  None.       Chief Complaint   2019 10:06 AM CDT   Physical;      Well Adult History   Well Adult History             The patient presents for well adult exam.  The patient's general health status is described as good.  The patient's diet is described as balanced.  Exercise: discussed.  Last menstrual period: postmenopausal.     discussed advanced directives, paperwork to complete provided  spot right cheek to check, not bothering her, cannot see it well due to location  chronic low back pain has been issue for years, discussed home treatment, encouraged exercise  stool for colon cancer screening         Review of Systems   ROS reviewed as documented in chart      Health Status   Allergies:    Allergic Reactions (Selected)  No Known Medication Allergies   Medications:  (Selected)      Problem list:    All Problems  Menarche / ICD-9-CM V21.8 / Confirmed  Inactive: Ganglion Cyst of Wrist / ICD-9-.41  Canceled: Amenorrhea / ICD-9-.0      Histories   Past Medical History:    Active  Menarche (ICD-9-CM V21.8): Onset in  at 12 years.   Family History:    Kidney stone  Mother  Hypertension  Mother  Heart disease  Mother  Comments:  12/10/2013 3:07 PM Karen Quezada  Onset late 50's or early 60's. Had diabetes many yrs before.  Diabetes mellitus type 2  Mother  Myocardial infarction  Grandmother (M)  Father  Smoker  Brother  Angioplasty  Brother: onset at 42 .  Cancer of prostate  Father  Esophageal tear  Father  MI (myocardial infarction).  Father  Comments:  12/10/2013 3:07 PM Karen Quezada  Late 50's or early 60's.     Procedure history:    HPV -  Human papillomavirus test negative ([id number]) on 3/17/2010 at 49 Years.  Comments:  2011 2:56 PM CDT - Alessio HUTCHINSON, Karen  Low risk for cervical cancer. OK to have pap q 3 yrs.  Excision of epidermal cyst on 10/21/1991 at 31 Years.   ([id number]).   Social History:        Alcohol Assessment: Current            1-2 times per month, 2 drinks/episode average.            Occasional      Tobacco Assessment: Denies Tobacco Use            Never smoker      Substance Abuse Assessment: Denies Substance Abuse            Never      Employment and Education Assessment            Employed                     Comments:                      2011 - [employer] with       Home and Environment Assessment            Marital status: .  Spouse/Partner name: Jed Bauman.      Nutrition and Health Assessment            Type of diet: Regular.      Exercise and Physical Activity Assessment: Regular exercise      Sexual Assessment            Sexually active: Yes.  Sexual orientation: Heterosexual.      Other Assessment            First menses age 11.  Irregular menses.      Physical Examination   Vital Signs   2019 10:06 AM CDT Temperature Tympanic 97.2 DegF  LOW    Peripheral Pulse Rate 60 bpm    HR Method Manual    Systolic Blood Pressure 102 mmHg    Diastolic Blood Pressure 60 mmHg    Mean Arterial Pressure 74 mmHg    BP Method Manual      Measurements from flowsheet : Measurements   2019 10:06 AM CDT Height Measured - Standard 64.5 in    Weight Measured - Standard 126.2 lb    BSA 1.61 m2    Body Mass Index 21.33 kg/m2      General:  Alert and oriented, No acute distress.    Eye:  Pupils are equal, round and reactive to light, Extraocular movements are intact, Normal conjunctiva.    HENT:  Normocephalic, Tympanic membranes are clear, Oral mucosa is moist, No pharyngeal erythema.    Neck:  Supple, Non-tender, No lymphadenopathy, No thyromegaly.     Respiratory:  Lungs are clear to auscultation.    Cardiovascular:  Normal rate, Regular rhythm.    Breast:  No mass, No tenderness, No discharge.    Gastrointestinal:  Soft, Non-tender, Non-distended, No organomegaly.    Musculoskeletal:  Normal range of motion, Normal strength.    Integumentary:  Warm, Dry, Pink, No rash, no concerning lesions, slight pigmented area on right cheek is flat, smooth, regular, likely associated with age.    Neurologic:  Alert, Oriented, Normal sensory.    Psychiatric:  Cooperative, Appropriate mood & affect.       Impression and Plan   Diagnosis     Well adult exam ([id number] Z00.00).     Course:  Progressing as expected.    Patient Instructions:       Counseled: Patient, BMI, diet, and exercise.    Orders     Orders (Selected)   Outpatient Orders  Ordered  Return to Clinic (Request): RFV: Annual px, Return in 1 year  Return to Clinic (Request): RFV: return FOBT, Return in 4 weeks.

## 2022-02-15 NOTE — NURSING NOTE
Depression Screening Entered On:  12/1/2020 2:07 PM CST    Performed On:  11/30/2020 2:07 PM CST by Regine Hannah               Depression Screening   Little Interest - Pleasure in Activities :   Not at all   Feeling Down, Depressed, Hopeless :   Not at all   Initial Depression Screen Score :   0 Score   Poor Appetite or Overeating :   Not at all   Trouble Falling or Staying Asleep :   Not at all   Feeling Tired or Little Energy :   Several days   Feeling Bad About Yourself :   Not at all   Trouble Concentrating :   Not at all   Moving or Speaking Slowly :   Not at all   Thoughts Better Off Dead or Hurting Self :   Not at all   FAMILIA Difficulty with Work, Home, Others :   Not difficult at all   Detailed Depression Screen Score :   1    Total Depression Screen Score :   1    Regine Hannah - 12/1/2020 2:07 PM CST

## 2022-02-15 NOTE — PROGRESS NOTES
Patient:   PHILIPPE BOYD            MRN: [id number]            FIN: [id number]               Age:   60 years     Sex:  Female     :  1960   Associated Diagnoses:   Well adult exam; Screening for cervical cancer; Screening for cardiovascular condition; Axillary lump   Author:   Eliz Lamas MD      Visit Information   Visit type:  Annual exam.    Source of history:  Self.    History limitation:  None.       Chief Complaint      Well Adult History   Well Adult History             The patient presents for well adult exam.  The patient's general health status is described as good.  The patient's diet is described as balanced.  Exercise: routine.  Associated symptoms consist of none.  Last menstrual period: postmenopausal.  Complaint: patient has noticed palpable lumps in right axilla, mammogram is up to date until February, no breast lump or discharge is noticed.        Review of Systems   Breast:  lumps right chest wall.    Integumentary:  dry skin on chest.    Neurologic:  episodes of lightheadedness past couple of years.    All other systems reviewed and negative      Health Status   Allergies:    Allergic Reactions (Selected)  No Known Medication Allergies   Medications:  (Selected)   Documented Medications  Documented  PreviDent 0.2% mucous membrane solution: 0 Refill(s), Type: Soft Stop  latanoprost 0.005% ophthalmic solution: 1 drop(s), Eye-Both, qpm, 0 Refill(s), Type: Maintenance   Problem list:    All Problems  Inactive: Ganglion Cyst of Wrist / ICD-9-.41  Left  Resolved: Menarche / ICD-9-CM V21.8  Canceled: Amenorrhea / ICD-9-.0      Histories   Past Medical History:    Resolved  Menarche (ICD-9-CM V21.8): Onset in  at 12 years.  Resolved.   Family History:    Kidney stone  Mother  Hypertension  Mother: onset at 40 .  Heart disease  Mother  Comments:  12/10/2013 3:07 PM STEPHANIE HUTCHINSON, Karen  Onset late 50's or early 60's. Had diabetes many yrs before.  Diabetes  mellitus type 2  Mother: onset at 30 .  Arthritis  Mother  Father  Myocardial infarction  Grandmother (M)  Father  Hypercholesterolemia  Father: onset at 40 .  Smoker  Brother  Angioplasty  Brother: onset at 42 .  Cancer of prostate  Father: onset at 45 .  Obesity  Mother: onset at 20 .  Esophageal tear  Father  MI (myocardial infarction).  Father  Comments:  12/10/2013 3:07 PM CST - Karen Haskins  Late 50's or early 60's.     Procedure history:    HPV - Human papillomavirus test negative ([id number]) on 3/17/2010 at 49 Years.  Comments:  2011 2:56 PM CDT - Karen Haskins  Low risk for cervical cancer. OK to have pap q 3 yrs.  Excision of epidermal cyst on 10/21/1991 at 31 Years.   ([id number]).   Social History:        Electronic Cigarette/Vaping Assessment            Electronic Cigarette Use: Never.      Alcohol Assessment: Current            1-2 times per month, 2 drinks/episode average.            Occasional      Tobacco Assessment: Denies Tobacco Use            Never smoker            Never (less than 100 in lifetime)      Substance Abuse Assessment: Denies Substance Abuse            Never      Employment and Education Assessment            Employed                     Comments:                      2011 - [employer] with       Home and Environment Assessment            Marital status: .  Spouse/Partner name: Jed Bauman.      Nutrition and Health Assessment            Type of diet: Regular.      Exercise and Physical Activity Assessment: Regular exercise      Sexual Assessment            Sexually active: Yes.  Sexual orientation: Heterosexual.      Other Assessment            First menses age 11.  Irregular menses.        Physical Examination   Vital Signs   2020 9:06 AM CST Temperature Tympanic 97 DegF  LOW    Peripheral Pulse Rate 67 bpm    Systolic Blood Pressure 142 mmHg  HI    Diastolic Blood Pressure 78 mmHg     Mean Arterial Pressure 99 mmHg    Oxygen Saturation 97 %      Measurements from flowsheet : Measurements   11/30/2020 9:06 AM CST Height Measured - Standard 64.5 in    Weight Measured - Standard 125.4 lb    BSA 1.61 m2    Body Mass Index 21.19 kg/m2      General:  Alert and oriented, No acute distress.    Eye:  Pupils are equal, round and reactive to light, Extraocular movements are intact, Normal conjunctiva.    HENT:  Normocephalic, Tympanic membranes are clear, Oral mucosa is moist, No pharyngeal erythema.    Neck:  Supple, Non-tender, No lymphadenopathy, No thyromegaly.    Respiratory:  Lungs are clear to auscultation.    Cardiovascular:  Normal rate, Regular rhythm.    Breast:  No mass, No tenderness, No discharge, right axilla with two firm mobile nodules, larger about 1cm oval, smaller is pea sized, nontender, no skin changes, nothing in left axilla.    Gastrointestinal:  Soft, Non-tender, Non-distended, No organomegaly.    Genitourinary:  Normal genitalia for age and sex, No urethral discharge, No lesions.         Perineum: Within normal limits.         Vagina: Within normal limits.         Uterus: Within normal limits.         Ovaries: Within normal limits.         Adnexa: Within normal limits.    Musculoskeletal:  Normal range of motion, Normal strength.    Integumentary:  Warm, Dry, Pink, No rash, no concerning lesions.    Neurologic:  Alert, Oriented, Normal sensory.    Psychiatric:  Cooperative, Appropriate mood & affect.       Review / Management   Results review      Impression and Plan   Diagnosis     Well adult exam (LUF30-BR Z00.00).     Screening for cervical cancer (MNK41-CX Z12.4).     Screening for cardiovascular condition (NYD19-BW Z13.6).     Course:  Progressing as expected.    Patient Instructions:       Counseled: Patient, BMI, diet, and exercise.    Orders     Orders (Selected)   Outpatient Orders  Ordered (In Transit)  Lipid panel with reflex to direct ldl* ([facility]): Specimen Type: Serum,  Collection Date: 11/30/20 9:39:00 CST  ThinPrep Imaging Pap and HPV mRNA E6/E7* ([facility]): Specimen Type: Pap, Collection Date: 11/30/20 9:54:00 CST.     Diagnosis     Axillary lump (PON95-FV R22.30).     Course:  by exam, shotty lymph nodes are present. Will get ultrasound to confirm diagnosis and see if biopsy is warranted. .    Orders     Orders (Selected)   Outpatient Orders  Ordered  US Axilla (Request): Instructions: right axillary ultrasound for 2 masses, Axillary lump.

## 2022-02-15 NOTE — PROGRESS NOTES
"Chief Complaint    Bilateral ear \"stuffiness\".  History of Present Illness       Patient is a 61-year-old female who complains of right ear stuffiness and pressure for about a week.  It was a lot of pressure 2 days ago.       She denies fevers or chills.  No myalgias.       Chronic postnasal drip.       She has sore throat last week but that has improved       Occasional cough       She does note fatigue but states that has been chronic.  She generally has felt more fatigued and is not sure why.  She will have some days that she has more energy.          Review of Systems       Negative except as listed in HPI  Physical Exam   Vitals & Measurements    T: 98.2  F (Tympanic)  HR: 76 (Peripheral)  BP: 134/68     WT: 125.3 lb        Vitals noted and within normal limits.       Patient is alert, oriented and in no acute distress.       Eyes: conjunctiva not injected.       Right ear canal completely blocked by cerumen impaction       Recheck of right ear after CMA performed ear lavage:       canals patent, TMs intact, no erythema and no bulging       Mouth: mucous membranes pink and moist, pharynx not erythematous       Neck is supple with no lymphadenopathy       Heart: regular rate and rhythm with no murmur       Lungs: clear to auscultation bilaterally          Assessment/Plan       Cerumen impaction (H61.20)       Fatigue (R53.83)         Journal lifestyle (sleep, food, activity) and fatigue symptoms        offered lab testing and follow up with primary MD but pt prefers to journal first  Patient Information     Name:PHILIPPE BOYD      Address:      [address] [id number]     Sex:Female     YOB: 1960     [phone]     Emergency Contact:ASTRID ERNST     MRN:[id number]     FIN:[id number]     Location:[facility]     Date of Service:07/26/2021      Primary Care Physician:       Adams DIAZ [facility], [phone]      Attending Physician:       Que DIAZ, " Lolly, [phone]  Problem List/Past Medical History    Ongoing     Ganglion Cyst of Wrist       Comments: Left    Historical     Menarche  Procedure/Surgical History     HPV - Human papillomavirus test negative (2010)            Comments: Low risk for cervical cancer. OK to have pap q 3 yrs..     Excision of epidermal cyst (10/21/1991)                   Medications    Daily Multi oral tablet, Oral, daily    immune limitless anti-inflammatory, Oral, daily    latanoprost 0.005% ophthalmic solution, 1 drop(s), Eye-Both, qpm    PreviDent 0.2% mucous membrane solution  Allergies    No Known Medication Allergies  Social History    Smoking Status     Never smoker     Alcohol - Current      1-2 times per month, 2 drinks/episode average.     Electronic Cigarette/Vaping      Electronic Cigarette Use: Never.     Employment/School      Employed, Work/School description: [employer]. Highest education level: Vocational school.     Home/Environment      Marital status: . Spouse/Partner name: Jed Bauman. Living situation: Home/Independent. Injuries/Abuse/Neglect in household: No. Feels unsafe at home: No. Family/Friends available for support: Yes.     Nutrition/Health      Type of diet: Regular.     Other      First menses age 11. Irregular menses.     Sexual      Sexually active: Yes. Identifies as female, Sexual orientation: Straight or heterosexual. History of STD: No. Contraceptive Use Details: menopause. History of sexual abuse: No.     Substance Abuse - Denies Substance Abuse      Never     Tobacco - Denies Tobacco Use      Never (less than 100 in lifetime)  Family History    Angioplasty: Brother (Dx at 42).    Arthritis: Mother and Father.    Cancer of prostate: Father (Dx at 45).    Diabetes mellitus type 2: Mother (Dx at 30).    Esophageal tear: Father.    Heart disease: Mother.    Hypercholesterolemia: Father (Dx at 40).    Hypertension: Mother (Dx at 40).    Kidney stone: Mother.    MI (myocardial  infarction).: Father.    Myocardial infarction: Father and Grandmother (M).    Obesity: Mother (Dx at 20).    Smoker: Brother.    Brother: History is negative    Sister: History is negative  Immunizations       Scheduled Immunizations       Dose Date(s)       MMR (measles/mumps/rubella)       01/19/1996       Td       12/29/2008       tetanus/diphth/pertuss (Tdap) adult/adol       06/12/2012       Other Immunizations               Td       09/18/2002       tetanus/diphth/pertuss (Tdap) adult/adol       01/01/1994, 04/17/2019       SARS-CoV-2 (COVID-19) Moderna-1273       04/15/2021, 05/13/2021

## 2022-02-15 NOTE — LETTER
(Inserted Image. Unable to display)   [address] [place] [address]   December 17, 2020        PHILIPPE ERNST       [place], [place] [id number]      Dear PHILIPPE,    Thank you for selecting [facility] for your healthcare needs. Below you will find the results of the recent tests done at our clinic.     Your stool testing was NEGATIVE - no blood in the stool. We check this once a year.    Result Name Current Result   Fecal Globin  See comment 12/16/2020       Please contact me or my assistant at [phone] if you have any questions or concerns.     Sincerely,        Eliz Lamas M.D.

## 2022-02-15 NOTE — NURSING NOTE
Comprehensive Intake Entered On:  4/17/2019 10:14 AM CDT    Performed On:  4/17/2019 10:06 AM CDT by Ximena Prescott MA               Summary   Chief Complaint :   Physical;    Menstrual Status :   Postmenopausal   Weight Measured :   126.2 lb(Converted to: 126 lb 3 oz, 57.24 kg)    Height Measured :   64.5 in(Converted to: 5 ft 4 in, 163.83 cm)    Body Mass Index :   21.33 kg/m2   Body Surface Area :   1.61 m2   Systolic Blood Pressure :   102 mmHg   Diastolic Blood Pressure :   60 mmHg   Mean Arterial Pressure :   74 mmHg   Peripheral Pulse Rate :   60 bpm   BP Method :   Manual   HR Method :   Manual   Temperature Tympanic :   97.2 DegF(Converted to: 36.2 DegC)  (LOW)    Race :      Languages :   English   Ethnicity :   Not  or    Ximena Prescott MA - 4/17/2019 10:06 AM CDT   Health Status   Allergies Verified? :   Yes   Medication History Verified? :   Yes   Immunizations Current :   Yes   Medical History Verified? :   Yes   Pre-Visit Planning Status :   Completed   Tobacco Use? :   Never smoker   Ximena Prescott MA - 4/17/2019 10:06 AM CDT   Consents   Consent for Immunization Exchange :   Consent Granted   Consent for Immunizations to Providers :   Consent Granted   Ximena Prescott MA - 4/17/2019 10:06 AM CDT   Meds / Allergies   (As Of: 4/17/2019 10:14:45 AM CDT)   Allergies (Active)   No Known Medication Allergies  Estimated Onset Date:   Unspecified ; Created By:   Ximena Prescott MA; Reaction Status:   Active ; Category:   Drug ; Substance:   No Known Medication Allergies ; Type:   Allergy ; Updated By:   Ximena Prescott MA; Reviewed Date:   4/17/2019 10:10 AM CDT        Medication List   (As Of: 4/17/2019 10:14:45 AM CDT)   No Known Home Medications     Ximena Prescott MA - 4/17/2019 10:10:32 AM

## 2022-02-15 NOTE — NURSING NOTE
Vital Signs Entered On:  1/13/2021 3:23 PM CST    Performed On:  1/13/2021 3:23 PM CST by Kathy Stuart RN               Vital Signs   Systolic Blood Pressure :   126 mmHg   Diastolic Blood Pressure :   78 mmHg   Mean Arterial Pressure :   94 mmHg   BP Site :   Right arm   Kathy Stuart RN - 1/13/2021 3:23 PM CST

## 2022-02-15 NOTE — TELEPHONE ENCOUNTER
---------------------  From: Collette Gutierrez CMA   To: [employer] Message Pool ([id number]_[facility]);     Sent: 12/30/2021 10:22:32 AM CST  Subject: Stool card     PCP:   RANDALL      Time of Call:  1020       Person Calling:  Patient  Phone number:  [phone]    Returned call at: _    Note:   Patient called asking if a stool card could be mailed to her? Per RTC she is due. She has a physical exam appt set up for Jan w/ [facility]. Confirmed address in chart is correct.    Last office visit and reason:  11/30/20 Px w/ RANDALL---------------------  From: Collette Sotomayor ([employer] Message Pool ([id number]))   To: Eliz Lamas MD;     Sent: 12/30/2021 10:25:31 AM CST  Subject: FW: Stool card     Would you like to talk to pt about ordering a stool card with an appt?We need to use caution with mailing in the winter because if they are left out in a mailbox and freeze, they are not accurate.---------------------  From: Eliz Lamas MD   To: [employer] Message Pool ([id number]_[facility]);     Sent: 12/30/2021 11:10:13 AM CST  Subject: RE: Stool cardPt actually looking for Colog[employer] testing kit. Told pt this is something that will be started at her px. She will need to sign the form and we will have [employer] mail her the kit.     No other questions.

## 2022-02-15 NOTE — NURSING NOTE
Hearing and Vision Screening Entered On:  4/17/2019 10:49 AM CDT    Performed On:  4/17/2019 10:49 AM CDT by Deanna Colin CMA               Hearing and Vision Screening   Audiogram Result Right Ear :   Pass   Audiogram Result Left Ear :   Pass   Hearing Screen Comments :   Repeat in one year   Deanna Colin CMA - 4/17/2019 10:49 AM CDT

## 2022-02-15 NOTE — NURSING NOTE
Hearing and Vision Screening Entered On:  11/30/2020 9:10 AM CST    Performed On:  11/30/2020 9:10 AM CST by Ines Flor CMA               Hearing and Vision Screening   Audiogram Result Right Ear :   Pass   Audiogram Result Left Ear :   Pass   Ines Flor CMA - 11/30/2020 9:10 AM CST

## 2022-02-15 NOTE — NURSING NOTE
CAGE Assessment Entered On:  4/17/2019 10:50 AM CDT    Performed On:  4/17/2019 10:50 AM CDT by Deanna Colin CMA               Assessment   Have you ever felt you should cut down on your drinking :   No   Have people annoyed you by criticizing your drinking :   No   Have you ever felt bad or guilty about your drinking :   No   Have you ever taken a drink first thing in the morning to steady your nerves or get rid of a hangover (Eye-opener) :   No   CAGE Score :   0    Deanna Colin CMA - 4/17/2019 10:50 AM CDT

## 2022-02-15 NOTE — PROGRESS NOTES
Patient:   PHILIPPE BOYD            MRN: [id number]            FIN: [id number]               Age:   59 years     Sex:  Female     :  1960   Associated Diagnoses:   Viral URI with cough   Author:   Jarrod Pascual PA-C      Report Summary   Diagnosis  Viral URI with cough (LMZ01-DB J06.9).  Patient Instructions   Visit Information      Date of Service: 2019 02:57 pm  Performing Location: [facility]  Encounter#: [id number]      Primary Care Provider (PCP):  Eliz Lamas MD    NPI# [id number]      Referring Provider:  Jarrod Pascual PA-C    NPI# [id number]   Visit type:  New symptom.    Accompanied by:  Spouse.    Source of history:  Self, Medical record.    History limitation:  None.       Chief Complaint   2019 3:01 PM CDT    c/o fatigue, loss of voice, slight sore throat, mild cough x 4-5 days        History of Present Illness             The patient presents with cough.  The cough is described as productive green sputum.  The severity of the cough is moderate.  The cough is constant.  The cough has lasted for 4.5 day(s).  The context of the cough: occurred in association with illness.  Associated symptoms consist of denies chills, denies fever and denies shortness of breath.  Feels better today. Wants to be sure her lungs are clear..        Review of Systems   Constitutional:  Fatigue, No fever, No chills.    Eye:  Negative.    Ear/Nose/Mouth/Throat:  Negative except as documented in history of present illness.    Respiratory:  No shortness of breath.       Health Status   Allergies:    Allergic Reactions (All)  No Known Medication Allergies  Canceled/Inactive Reactions (All)  No known allergies   Medications:  (Selected)   ,    Medications          No Known Home Medications     Problem list:    All Problems  Inactive: Ganglion Cyst of Wrist / ICD-9-.41  Resolved: Menarche / ICD-9-CM V21.8  Canceled: Amenorrhea / ICD-9-.0      Histories   Past Medical History:     Resolved  Menarche (V21.8): Onset in  at 12 years.  Resolved.   Family History:    Kidney stone  Mother  Hypertension  Mother: onset at 40 .  Heart disease  Mother  Comments:  12/10/2013 3:07 PM Karen Quezada  Onset late 50's or early 60's. Had diabetes many yrs before.  Diabetes mellitus type 2  Mother: onset at 30 .  Arthritis  Mother  Father  Myocardial infarction  Grandmother (M)  Father  Hypercholesterolemia  Father: onset at 40 .  Smoker  Brother  Angioplasty  Brother: onset at 42 .  Cancer of prostate  Father: onset at 45 .  Obesity  Mother: onset at 20 .  Esophageal tear  Father  MI (myocardial infarction).  Father  Comments:  12/10/2013 3:07 PM Karen Quezada  Late 50's or early 60's.     Procedure history:    HPV - Human papillomavirus test negative ([id number]) on 3/17/2010 at 49 Years.  Comments:  2011 2:56 PM Karen Linda  Low risk for cervical cancer. OK to have pap q 3 yrs.  Excision of epidermal cyst on 10/21/1991 at 31 Years.   ([id number]).   Social History:        Alcohol Assessment: Current            1-2 times per month, 2 drinks/episode average.            Occasional      Tobacco Assessment: Denies Tobacco Use            Never smoker      Substance Abuse Assessment: Denies Substance Abuse            Never      Employment and Education Assessment            Employed                     Comments:                      2011 - [employer] with       Home and Environment Assessment            Marital status: .  Spouse/Partner name: Jed Bauman.      Nutrition and Health Assessment            Type of diet: Regular.      Exercise and Physical Activity Assessment: Regular exercise      Sexual Assessment            Sexually active: Yes.  Sexual orientation: Heterosexual.      Other Assessment            First menses age 11.  Irregular menses.        Physical Examination   Vital Signs    8/12/2019 3:01 PM CDT Temperature Temporal 97.5 DegF    Peripheral Pulse Rate 64 bpm    Pulse Site Radial artery    HR Method Manual    Systolic Blood Pressure 118 mmHg    Diastolic Blood Pressure 74 mmHg    Mean Arterial Pressure 89 mmHg    BP Site Right arm    BP Method Manual      Measurements from flowsheet : Measurements   8/12/2019 3:01 PM CDT Height Measured - Standard 64.5 in    Weight Measured - Standard 122 lb    BSA 1.59 m2    Body Mass Index 20.62 kg/m2      General:  Alert and oriented, No acute distress.    Eye:  Pupils are equal, round and reactive to light, Extraocular movements are intact, Normal conjunctiva.    HENT:  Normocephalic, Oral mucosa is moist, No pharyngeal erythema.    Neck:  Supple, Non-tender, No lymphadenopathy.    Respiratory:  Lungs are clear to auscultation, Respirations are non-labored, Breath sounds are equal.    Cardiovascular:  Normal rate, Regular rhythm, No murmur.    Psychiatric:  Cooperative, Appropriate mood & affect.       Review / Management   Results review      Impression and Plan   Diagnosis     Viral URI with cough (QOZ02-UK J06.9).     Patient Instructions:       Counseled: Patient, Regarding diagnosis, Regarding diet, Regarding activity, Verbalized understanding.    Push fluids, rest. If not continuing to improve or if worse to call.

## 2022-02-21 ENCOUNTER — COMMUNICATION - RIVER FALLS (OUTPATIENT)
Dept: FAMILY MEDICINE | Facility: CLINIC | Age: 62
End: 2022-02-21
Payer: COMMERCIAL

## 2022-02-28 ENCOUNTER — VIRTUAL VISIT (OUTPATIENT)
Dept: FAMILY MEDICINE | Facility: CLINIC | Age: 62
End: 2022-02-28
Payer: COMMERCIAL

## 2022-02-28 DIAGNOSIS — M81.0 AGE-RELATED OSTEOPOROSIS WITHOUT CURRENT PATHOLOGICAL FRACTURE: ICD-10-CM

## 2022-02-28 PROCEDURE — 99213 OFFICE O/P EST LOW 20 MIN: CPT | Mod: GT | Performed by: FAMILY MEDICINE

## 2022-02-28 NOTE — PATIENT INSTRUCTIONS
Living with Osteoporosis: Preventing Fractures  If you have osteoporosis, you can do a lot to reduce its effect on your life. Knowing how to prevent fractures and spinal curvature can help you live more comfortably and safely with this disease.    Reducing your risk for fractures  The most common fracture sites in people with osteoporosis are the wrist, spine, and hip. These fractures are often caused by accidents and falls. All fractures are painful and may limit what you can do. But hip fractures are very serious. They often need surgery, and it can take months to recover. To reduce your risk for fractures:    Get regular exercise. Try walking, swimming, or weight training.    Eat foods that are rich in calcium, or take calcium supplements.    Make your home safe to help avoid accidents.    Take extra precautions not to fall in risky areas, such as icy sidewalks.  Understanding spinal fractures  Your spine is made up of many bones called vertebrae. Osteoporosis can cause the vertebrae in your spine to collapse. As a result, your upper back may arch forward, creating a curvature. Spine fractures may also result from back strain and bad posture. You will also lose height. Your lower spine must then adjust to keep your body balanced. This can cause back pain. To prevent or lessen these spinal changes:    Practice good posture.    Use proper techniques if you need to lift heavy objects.    Do back exercises to help your posture.    Lie on your back when you have pain.    Ask your healthcare provider about these and other ways to help your spine.  [employer] last reviewed this educational content on 5/1/2018 2000-2021 [employer]. All rights reserved. This information is not intended as a substitute for professional medical care. Always follow your healthcare professional's instructions.        Preventing Osteoporosis: Meeting Your Calcium Needs    Your body needs calcium to build and repair bones. But  it can't make calcium on its own. That's why it's important to eat calcium-rich foods. Some foods are naturally rich in calcium. Others have calcium added (fortified). It's best to get calcium from the foods you eat. But if you can't get enough, you may want to take calcium supplements. To meet your daily calcium needs, try the foods listed below.  Dairy Fish & beans Other sources   Source   Calcium (mg) per serving   Source   Calcium (mg) per serving   Source   Calcium (mg) per serving   Low-fat yogurt, plain   415 mg/8 oz.   Sardines, Atlantic, canned, with bones   351 mg/3 oz.   Oatmeal, instant, fortified   215 mg/1 cup   Nonfat milk   302 mg/1 cup   Chaplin, sockeye, canned, with bones   239 mg/3 oz.   Tofu made with calcium sulfate   204 mg/3 oz.   Low-fat milk   297 mg/1 cup   Soybeans, fresh, boiled   131 mg/1/2 cup   Collards   179 mg/1/2 cup   Swiss cheese   272 mg/1 oz.   White beans, cooked   81 mg/1/2 cup   English muffin, whole wheat   175 mg/1 muffin   Cheddar cheese   205 mg/1 oz.   Navy beans, cooked   79 mg/1/2 cup   Kale   90 mg/1/2 cup   Ice cream strawberry   79 mg/1/2 cup           Orange, navel   56 mg/1 medium   Note: Calcium levels may vary depending on brand and size.  Daily calcium needs  14 to 18 years old: 1,300 mg  19 to 30 years old: 1,000 mg  31 to 50 years old: 1,000 mg  51 to 70 years old, women: 1,200 mg  51 to 70 years old, men: 1,000 mg  Pregnant or nursin to 18 years old: 1,300 mg, 19 to 50 years old: 1,000 mg  Older than 70 (women and men): 1,200 mg   [employer] last reviewed this educational content on 2018-2021 [employer]. All rights reserved. This information is not intended as a substitute for professional medical care. Always follow your healthcare professional's instructions.

## 2022-02-28 NOTE — PROGRESS NOTES
Cat is a 61 year old who is being evaluated via a billable video visit.      How would you like to obtain your AVS? MyChart  If the video visit is dropped, the invitation should be resent by: Text to cell phone: in chart  Will anyone else be joining your video visit? No    Video Start Time: 1040 to 1105  On doximity    Assessment & Plan     Age-related osteoporosis without current pathological fracture  Discussed options of medication with patient   Will start calcium 1200-1500mg daily and vitamin D 1444-4502 international unit(s)  Referral to dietician to discuss diet  Weight bearing exercise  Repeat DEXA in 2 years  - Nutrition Referral; Future    Review of the result(s) of each unique test - DEXA         See Patient Instructions    No follow-ups on file.    Eliz Lamas MD  [facility]    Subjective   Cat is a 61 year old who presents for the following health issues     HPI   Osteoporosis follow up  Patient with abnormal DEXA   no prior for comparison            Review of Systems         Objective           Vitals:  No vitals were obtained today due to virtual visit.    Physical Exam   GENERAL: Healthy, alert and no distress  EYES: Eyes grossly normal to inspection.  No discharge or erythema, or obvious scleral/conjunctival abnormalities.  RESP: No audible wheeze, cough, or visible cyanosis.  No visible retractions or increased work of breathing.    SKIN: Visible skin clear. No significant rash, abnormal pigmentation or lesions.  PSYCH: Mentation appears normal, affect normal/bright, judgement and insight intact, normal speech and appearance well-groomed.    DEXA reviewed            Video-Visit Details    Type of service:  Video Visit    Originating Location (pt. Location): Home    Distant Location (provider location):  [facility]     Platform used for Video Visit: NUVETA

## 2022-02-28 NOTE — PROGRESS NOTES
"Cat is a 61 year old who is being evaluated via a billable video visit.      How would you like to obtain your AVS? MyChart  If the video visit is dropped, the invitation should be resent by: Text to cell phone: [phone]  Will anyone else be joining your video visit? {:480257}  {If patient encounters technical issues they should call [phone] :297201}    Video Start Time: {video visit start/end time for provider to select:152948}    {PROVIDER CHARTING PREFERENCE:684818}    Subjective   Cat is a 61 year old who presents for the following health issues {ACCOMPANIED BY STATEMENT (Optional):047738}    HPI     {SUPERLIST (Optional):108826}  {additonal problems for provider to add (Optional):773530}    Review of Systems   {ROS COMP (Optional):269977}      Objective           Vitals:  No vitals were obtained today due to virtual visit.    Physical Exam   {video visit exam brief selected:735448::\"GENERAL: Healthy, alert and no distress\",\"EYES: Eyes grossly normal to inspection.  No discharge or erythema, or obvious scleral/conjunctival abnormalities.\",\"RESP: No audible wheeze, cough, or visible cyanosis.  No visible retractions or increased work of breathing.  \",\"SKIN: Visible skin clear. No significant rash, abnormal pigmentation or lesions.\",\"NEURO: Cranial nerves grossly intact.  Mentation and speech appropriate for age.\",\"PSYCH: Mentation appears normal, affect normal/bright, judgement and insight intact, normal speech and appearance well-groomed.\"}    {Diagnostic Test Results (Optional):962383}    {AMBULATORY ATTESTATION (Optional):769606}        Video-Visit Details    Type of service:  Video Visit    Video End Time:{video visit start/end time for provider to select:152948}    Originating Location (pt. Location): {video visit patient location:467514::\"Home\"}    Distant Location (provider location):  [facility]     Platform used for Video Visit: {Virtual Visit " "Platforms:258257::\"Laz\"}  "

## 2022-03-02 VITALS
DIASTOLIC BLOOD PRESSURE: 78 MMHG | HEART RATE: 60 BPM | SYSTOLIC BLOOD PRESSURE: 128 MMHG | HEIGHT: 64 IN | WEIGHT: 128 LBS | SYSTOLIC BLOOD PRESSURE: 138 MMHG | TEMPERATURE: 97.8 F | DIASTOLIC BLOOD PRESSURE: 74 MMHG | BODY MASS INDEX: 21.85 KG/M2 | OXYGEN SATURATION: 98 % | HEIGHT: 64 IN | HEART RATE: 57 BPM | BODY MASS INDEX: 21.97 KG/M2

## 2022-03-02 NOTE — LETTER
(Inserted Image. Unable to display)   [address] [facility] [address]  [phone] (phone) [phone] (fax)  February 16, 2022        [place] [address]      Dear PHILIPPE,      Thank you for selecting [facility] for your healthcare needs.      Your bone density results indicate:       Osteoporosis in hip/spine  Our recommendation is:  Schedule an appointment with your health care provider to discuss your results.         Calcium    Recommended daily amounts for men and women are:  o Men age 50 - 69  1000 mg  o Men age 70+  1200 mg  o Women age 50+  1200 mg  Vitamin D    800 - 1000 IU daily  Weight bearing Exercise    30 minutes or more several times a week  Avoid excess alcohol and smoking  Prevent falling    Avoid excessive sedation or hypotension (low blood pressure)    Improve strength    Decrease or remove environmental hazards                      Please contact me or my assistant at [phone] if you have any questions or concerns.     Sincerely,      Eliz Lamas MD

## 2022-03-02 NOTE — NURSING NOTE
Depression Screening Entered On:  1/31/2022 2:34 PM CST    Performed On:  1/24/2022 2:34 PM CST by Tasia Sanders               Depression Screening   Little Interest - Pleasure in Activities :   Not at all   Feeling Down, Depressed, Hopeless :   Not at all   Initial Depression Screen Score :   0 Score   Poor Appetite or Overeating :   Not at all   Feeling Tired or Little Energy :   Several days   Feeling Bad About Yourself :   Not at all   Trouble Concentrating :   Not at all   Moving or Speaking Slowly :   Not at all   Thoughts Better Off Dead or Hurting Self :   Not at all   Difficulty at Work, Home, Getting Along :   Not difficult at all   Tasia Sanders - 1/31/2022 2:34 PM CST

## 2022-03-02 NOTE — PROGRESS NOTES
Patient:   PHILIPPE BOYD            MRN: [id number]            FIN: [id number]               Age:   61 years     Sex:  Female     :  1960   Associated Diagnoses:   Well adult exam   Author:   Eliz Lamas MD      Visit Information   Visit type:  Annual exam.    Source of history:  Self.    History limitation:  None.       Chief Complaint   2022 11:27 AM CST   Px      Well Adult History   Well Adult History             The patient presents for well adult exam.  The patient's general health status is described as good.  The patient's diet is described as balanced.     will schedule mammogram at [facility], colon cancer screening with FIT, pap up to date for at least 4 years  had lymphadenopathy right axilla, still sometimes feels lumps, reviewed prior ultrasound and biopsy  check tremors, has noticed primarily in right hand, only when holding newspaper or book, never at rest, never with using fork or drinking from a cup  check ears, had some plugged ear on left previously, history of wax buildup  check nose, has noticed some skin changes  intermittent gut ache previously, has been better past year, using probiotic regularly, also has decreased bread intake  history of intermittent episodes of lightheadedness, last one she can remember was in September, has been issue for several years, no particular trigger noted      Review of Systems   All other systems reviewed and negative      Health Status   Allergies:    Allergic Reactions (Selected)  No Known Medication Allergies   Medications:  (Selected)   ,    Medications          No medications documented     Problem list:    All Problems  Inactive: Ganglion Cyst of Wrist / ICD-9-.41  Left  Resolved: Menarche / ICD-9-CM V21.8  Canceled: Amenorrhea / ICD-9-.0      Histories   Past Medical History:    Resolved  Menarche (ICD-9-CM V21.8): Onset in  at 12 years.  Resolved.   Family History:    Kidney stone  Mother  Hypertension  Mother: onset at  40 .  Heart disease  Mother  Comments:  12/10/2013 3:07 PM Karen Quezada  Onset late 50's or early 60's. Had diabetes many yrs before.  Diabetes mellitus type 2  Mother: onset at 30 .  Arthritis  Mother  Father  Myocardial infarction  Grandmother (M)  Father  Hypercholesterolemia  Father: onset at 40 .  Smoker  Brother  Angioplasty  Brother: onset at 42 .  Cancer of prostate  Father: onset at 45 .  Obesity  Mother: onset at 20 .  Esophageal tear  Father  MI (myocardial infarction).  Father  Comments:  12/10/2013 3:07 PM Karen Quezada  Late 50's or early 60's.     Procedure history:    HPV - Human papillomavirus test negative ([id number]) on 3/17/2010 at 49 Years.  Comments:  2011 2:56 PM Karen Linda  Low risk for cervical cancer. OK to have pap q 3 yrs.  Excision of epidermal cyst on 10/21/1991 at 31 Years.   ([id number]).   Social History:        Electronic Cigarette/Vaping Assessment            Electronic Cigarette Use: Never.      Alcohol Assessment: Current            1-2 times per month, 2 drinks/episode average.            Occasional      Tobacco Assessment: Denies Tobacco Use            Never (less than 100 in lifetime)            Never smoker            Never (less than 100 in lifetime)      Substance Abuse Assessment: Denies Substance Abuse            Never      Employment and Education Assessment            Employed, Work/School description: [employer].  Highest education level: Vocational school.      Home and Environment Assessment            Marital status: .  Spouse/Partner name: Jed Bauman.  Living situation: Home/Independent.               Injuries/Abuse/Neglect in household: No.  Feels unsafe at home: No.  Family/Friends available for support:               Yes.      Nutrition and Health Assessment            Type of diet: Regular.      Sexual Assessment            Sexually active: Yes.  Identifies as female, Sexual orientation:  Straight or heterosexual.  History of STD:               No.  Contraceptive Use Details: menopause.  History of sexual abuse: No.      Other Assessment            First menses age 11.  Irregular menses.        Physical Examination   Vital Signs   1/24/2022 12:20 PM CST Systolic Blood Pressure 138 mmHg  HI    Diastolic Blood Pressure 78 mmHg    Mean Arterial Pressure 98 mmHg   1/24/2022 11:27 AM CST Temperature Tympanic 97.8 DegF  LOW    Peripheral Pulse Rate 57 bpm  LOW    Systolic Blood Pressure 180 mmHg  HI    Diastolic Blood Pressure 85 mmHg  HI    Mean Arterial Pressure 117 mmHg    Oxygen Saturation 98 %    Systolic Blood Pressure Sitting 176 mmHg    Diastolic Blood Pressure Sitting 80 mmHg      Measurements from flowsheet : Measurements   1/24/2022 11:27 AM CST Height Measured - Standard 64 in    Weight Measured - Standard 128 lb    BSA 1.62 m2    Body Mass Index 21.97 kg/m2      General:  Alert and oriented, No acute distress.    Eye:  Pupils are equal, round and reactive to light, Extraocular movements are intact, Normal conjunctiva.    HENT:  Normocephalic, Tympanic membranes are clear (no significant wax), Oral mucosa is moist, No pharyngeal erythema.    Neck:  Supple, Non-tender, No lymphadenopathy, No thyromegaly.    Respiratory:  Lungs are clear to auscultation.    Cardiovascular:  Normal rate, Regular rhythm.    Breast:  No mass, No tenderness, No discharge.    Gastrointestinal:  Soft, Non-tender, Non-distended, No organomegaly.    Lymphatics:  no palpable lymph nodes in axilla.    Musculoskeletal:  Normal range of motion, Normal strength.    Integumentary:  Warm, Dry, Pink, No rash, mild tan pigmentation to nose, no scaly or erythematous lesions.    Neurologic:  Alert, Oriented, Normal sensory, no significant tremor, no cogwheel rigidity.    Psychiatric:  Cooperative, Appropriate mood & affect.       Impression and Plan   Diagnosis     Well adult exam ([id number]-KH Z00.00).     Course:  Progressing as  expected.    Plan:  patient is up to date, COVID booster today. No changes at this time. Will continue to monitor BP at home. Reviewed lifestyle behaviors that can decrease BP, generally she is following all recommended lifestyle.    Patient Instructions:       Counseled: Patient, BMI, diet, and exercise.    discussed elimination diet to address bowel issues  tremor is benign

## 2022-03-02 NOTE — NURSING NOTE
Comprehensive Intake Entered On:  1/27/2022 3:51 PM CST    Performed On:  1/27/2022 3:43 PM CST by Shanda Enrique LPN               Summary   Chief Complaint :   swelling under right arm started after covid vaccine.   Menstrual Status :   Postmenopausal   Height Measured :   64 in(Converted to: 5 ft 4 in, 162.56 cm)    Systolic Blood Pressure :   128 mmHg   Diastolic Blood Pressure :   74 mmHg   Mean Arterial Pressure :   92 mmHg   Peripheral Pulse Rate :   60 bpm   BP Site :   Right arm   Pulse Site :   Radial artery   BP Method :   Manual   HR Method :   Manual   Race :      Languages :   English   Ethnicity :   Not  or    Shanda Enrique LPN - 1/27/2022 3:43 PM CST   Health Status   Allergies Verified? :   Yes   Medication History Verified? :   Yes   Immunizations Current :   Yes   Pre-Visit Planning Status :   Completed   Shanda Enrique LPN - 1/27/2022 3:43 PM CST   Consents   Consent for Immunization Exchange :   Consent Granted   Consent for Immunizations to Providers :   Consent Granted   Shanda Enrique LPN - 1/27/2022 3:43 PM CST   Meds / Allergies   (As Of: 1/27/2022 3:51:08 PM CST)   Allergies (Active)   No Known Medication Allergies  Estimated Onset Date:   Unspecified ; Created By:   Ximena Prescott MA; Reaction Status:   Active ; Category:   Drug ; Substance:   No Known Medication Allergies ; Type:   Allergy ; Updated By:   Ximena Prescott MA; Reviewed Date:   1/27/2022 3:50 PM CST        Medication List   (As Of: 1/27/2022 3:51:08 PM CST)

## 2022-03-02 NOTE — NURSING NOTE
CAGE Assessment Entered On:  1/31/2022 2:35 PM CST    Performed On:  1/24/2022 2:34 PM CST by Tasia Sanders               Assessment   Have you ever felt you should cut down on your drinking :   No   Have people annoyed you by criticizing your drinking :   No   Have you ever felt bad or guilty about your drinking :   No   Have you ever taken a drink first thing in the morning to steady your nerves or get rid of a hangover (Eye-opener) :   No   CAGE Score :   0    Tasia Sanders - 1/31/2022 2:34 PM CST

## 2022-03-02 NOTE — NURSING NOTE
Quick Intake Entered On:  1/24/2022 12:20 PM CST    Performed On:  1/24/2022 12:20 PM CST by Eliz Lamas MD               Summary   Menstrual Status :   Postmenopausal   Height Measured :   64 in(Converted to: 5 ft 4 in, 162.56 cm)    Systolic Blood Pressure :   138 mmHg (HI)    Diastolic Blood Pressure :   78 mmHg   Mean Arterial Pressure :   98 mmHg   Race :      Languages :   English   Ethnicity :   Not  or    Eliz Lamas MD - 1/24/2022 12:20 PM CST

## 2022-03-02 NOTE — NURSING NOTE
Comprehensive Intake Entered On:  1/24/2022 11:35 AM CST    Performed On:  1/24/2022 11:27 AM CST by Collette Sotomayor               Summary   Chief Complaint :   Px   Menstrual Status :   Postmenopausal   Weight Measured :   128 lb(Converted to: 128 lb 0 oz, 58.060 kg)    Height Measured :   64 in(Converted to: 5 ft 4 in, 162.56 cm)    Body Mass Index :   21.97 kg/m2   Body Surface Area :   1.62 m2   Systolic Blood Pressure :   180 mmHg (HI)    Diastolic Blood Pressure :   85 mmHg (HI)    Mean Arterial Pressure :   117 mmHg   Peripheral Pulse Rate :   57 bpm (LOW)    Temperature Tympanic :   97.8 DegF(Converted to: 36.6 DegC)  (LOW)    Oxygen Saturation :   98 %   Race :      Languages :   English   Ethnicity :   Not  or    Collette Sotomayor - 1/24/2022 11:27 AM CST   Health Status   Allergies Verified? :   Yes   Medication History Verified? :   Yes   Immunizations Current :   Yes   Medical History Verified? :   No   Pre-Visit Planning Status :   Completed   Tobacco Use? :   Never smoker   Collette Sotomayor - 1/24/2022 11:27 AM CST   Meds / Allergies   (As Of: 1/24/2022 11:35:01 AM CST)   Allergies (Active)   No Known Medication Allergies  Estimated Onset Date:   Unspecified ; Created By:   Ximena Prescott MA; Reaction Status:   Active ; Category:   Drug ; Substance:   No Known Medication Allergies ; Type:   Allergy ; Updated By:   Ximena Prescott MA; Reviewed Date:   1/24/2022 11:31 AM CST        Medication List   (As Of: 1/24/2022 11:35:01 AM CST)   Home Meds    fluoride topical  :   fluoride topical ; Status:   Processing ; Ordered As Mnemonic:   PreviDent 0.2% mucous membrane solution ; Action Display:   Complete ; Catalog Code:   fluoride topical ; Order Dt/Tm:   1/24/2022 11:31:40 AM CST          latanoprost ophthalmic  :   latanoprost ophthalmic ; Status:   Processing ; Ordered As Mnemonic:   latanoprost 0.005% ophthalmic solution ; Action Display:   Complete ; Catalog Code:    latanoprost ophthalmic ; Order Dt/Tm:   1/24/2022 11:31:40 AM CST          Miscellaneous Prescription  :   Miscellaneous Prescription ; Status:   Processing ; Ordered As Mnemonic:   immune limitless anti-inflammatory ; Action Display:   Complete ; Catalog Code:   Miscellaneous Prescription ; Order Dt/Tm:   1/24/2022 11:31:40 AM CST          multivitamin with minerals  :   multivitamin with minerals ; Status:   Processing ; Ordered As Mnemonic:   Daily Multi oral tablet ; Action Display:   Complete ; Catalog Code:   multivitamin with minerals ; Order Dt/Tm:   1/24/2022 11:31:40 AM CST            Social History   Social History   (As Of: 1/24/2022 11:35:01 AM CST)   Alcohol:  Current      Occasional   (Last Updated: 12/1/2010 1:43:53 PM CST by Izzy Calle)   1-2 times per month, 2 drinks/episode average.   (Last Updated: 4/20/2017 10:51:07 AM CDT by Shanda Leon CMA)          Tobacco:  Denies Tobacco Use      Never smoker   (Last Updated: 6/9/2017 11:54:19 AM CDT by Izzy Calle)   Never (less than 100 in lifetime)   (Last Updated: 11/30/2020 9:06:43 AM CST by Ines Flor CMA)   Never (less than 100 in lifetime)   (Last Updated: 1/24/2022 11:31:28 AM CST by Collette Sotomayor)          Electronic Cigarette/Vaping:        Electronic Cigarette Use: Never.   (Last Updated: 1/24/2022 11:31:31 AM CST by Collette Sotomayor)          Substance Abuse:  Denies Substance Abuse      Never   (Last Updated: 6/9/2017 11:54:24 AM CDT by Izzy Calle)          Employment/School:        Employed, Work/School description: [employer].  Highest education level: Vocational school.   (Last Updated: 12/1/2020 2:06:20 PM CST by Regine Hannah)          Home/Environment:        Marital status: .  Spouse/Partner name: Jed Bauman.  Living situation: Home/Independent.  Injuries/Abuse/Neglect in household: No.  Feels unsafe at home: No.  Family/Friends available for support: Yes.   (Last Updated: 12/1/2020 2:06:37 PM CST by Regine Hannah)           Nutrition/Health:        Type of diet: Regular.   (Last Updated: 1/20/2016 2:14:51 PM CST by Stephanie Alfonso LPN)          Sexual:        Sexually active: Yes.  Identifies as female, Sexual orientation: Straight or heterosexual.  History of STD: No.  Contraceptive Use Details: menopause.  History of sexual abuse: No.   (Last Updated: 12/1/2020 2:07:15 PM CST by Regine Hannah)          Other:        First menses age 11.  Irregular menses.   (Last Updated: 6/9/2017 11:53:29 AM CDT by Izzy Calle)            More Vitals   Systolic Blood Pressure Sitting :   176 mmHg   Diastolic Blood Pressure Sitting :   80 mmHg   Collette Sotomayor - 1/24/2022 11:27 AM CST

## 2022-03-02 NOTE — LETTER
(Inserted Image. Unable to display)   [address] [place] [address]  [phone] (phone) [phone] (fax)  January 31, 2022      PHILIPPE ERNST      [facility] [place] [address]      Dear PHILIPPE,    Thank you for selecting [facility] for your healthcare needs. Below you will find the results of the recent tests done at our clinic.     Your stool testing was NEGATIVE - no blood in the stool. We check this once a year.    Result Name Current Result Previous Result   Fecal Globin  See comment 1/27/2022  See comment 12/16/2020       Please contact me or my assistant at [phone] if you have any questions or concerns.     Sincerely,        Eliz Lamas M.D.

## 2022-03-02 NOTE — PROGRESS NOTES
Chief Complaint    swelling under right arm started after covid vaccine.  History of Present Illness       Patient is here because of some swelling that occurred in her right axillary and now some bruising.  She did receive her Covid vaccine in the right arm recently.  She noticed the swelling and some discomfort but yesterday was quite active she describes reaching over a railing and leaning against it on her right side and then moving objects repeatedly.  She has done that before and never had difficulty with it.  Now this morning she noticed a small bruise where she would have been milligrams while  Review of Systems       No bruising elsewhere, no joint pain, no rashes, no fever or chills  Physical Exam   Vitals & Measurements    HR: 60 (Peripheral)  BP: 128/74     HT: 64 in        Alert and oriented no distress       Superficial light scattered bruising over a 5 x 5 inch area in the right axillary no lymphadenopathy good range of motion of her shoulder  Assessment/Plan       1. Bruising (T14.8XXA)          She certainly could have reacted to the Covid vaccine causing some swelling and this led to little different reaction then she usually has from leaning against and it caused some small amount of bruising.  No think there is any long-term dangers to her unless she starts getting worse  Patient Information     Name:PHILIPPE BOYD      Address:      [address] [id number]     Sex:Female     YOB: 1960     [phone]     Emergency Contact:ASTRID ERNST     MRN:[id number]     FIN:[id number]     Location:[facility]     Date of Service:01/27/2022      Primary Care Physician:       Eliz Lamas MD, [phone]      Attending Physician:       Mathieu Cohen MD, [phone]  Problem List/Past Medical History    Ongoing     Ganglion Cyst of Wrist       Comments: Left    Historical     Menarche  Procedure/Surgical History     HPV - Human  papillomavirus test negative (2010)      Comments: Low risk for cervical cancer. OK to have pap q 3 yrs..     Excision of epidermal cyst (10/21/1991)       Medications   No active medications  Allergies    No Known Medication Allergies  Social History    Smoking Status     Never smoker     Alcohol - Current      1-2 times per month, 2 drinks/episode average.     Electronic Cigarette/Vaping      Electronic Cigarette Use: Never.     Employment/School      Employed, Work/School description: [employer]. Highest education level: Vocational school.     Home/Environment      Marital status: . Spouse/Partner name: Jed Bauman. Living situation: Home/Independent. Injuries/Abuse/Neglect in household: No. Feels unsafe at home: No. Family/Friends available for support: Yes.     Nutrition/Health      Type of diet: Regular.     Other      First menses age 11. Irregular menses.     Sexual      Sexually active: Yes. Identifies as female, Sexual orientation: Straight or heterosexual. History of STD: No. Contraceptive Use Details: menopause. History of sexual abuse: No.     Substance Abuse - Denies Substance Abuse      Never     Tobacco - Denies Tobacco Use      Never (less than 100 in lifetime)  Family History    Angioplasty: Brother (Dx at 42).    Arthritis: Mother and Father.    Cancer of prostate: Father (Dx at 45).    Diabetes mellitus type 2: Mother (Dx at 30).    Esophageal tear: Father.    Heart disease: Mother.    Hypercholesterolemia: Father (Dx at 40).    Hypertension: Mother (Dx at 40).    Kidney stone: Mother.    MI (myocardial infarction).: Father.    Myocardial infarction: Father and Grandmother (M).    Obesity: Mother (Dx at 20).    Smoker: Brother.    Brother: History is negative    Sister: History is negative  Lab Results          Lab Results (Last 4 results within 90 days)           Fecal Globin: See comment (22 12:30:00)  Immunizations          Scheduled Immunizations          Dose Date(s)           MMR (measles/mumps/rubella)          01/19/1996          Td          12/29/2008          tetanus/diphth/pertuss (Tdap) adult/adol          06/12/2012          Other Immunizations          Td          09/18/2002          tetanus/diphth/pertuss (Tdap) adult/adol          01/01/1994, 04/17/2019          SARS-CoV-2 (COVID-19) Moderna-1273          04/15/2021, 05/13/2021, 01/24/2022

## 2022-03-02 NOTE — LETTER
(Inserted Image. Unable to display)   [address] [facility] [address]  [phone] (phone) [phone] (fax)  February 16, 2022        PHILIPPE ERNST  [address] [place] [address]      Dear PHILIPPE,      Thank you for selecting [facility] for your healthcare needs.      This letter is to inform you that we have received a copy of your mammogram results.  Your results were normal.  You will also receive notice of your results from the radiologist within 7-10 days.  This letter is to let you know I have also received a copy and it is filed in your clinic chart.      Please plan on having your next mammogram done in 12 months.       Please contact me or my assistant at [phone] if you have any questions or concerns.     Sincerely,      Eliz Lamas MD

## 2022-03-02 NOTE — TELEPHONE ENCOUNTER
---------------------  From: Vy Roy RN (Phone Messages Pool ([id number]_[facility]))   Sent: 2/21/2022 10:48:28 AM CST  Subject: results letter       PCP:  RANDALL      Time of Call: 10:23am       Person Calling:  pt  Phone number:      Returned call at: 10:43am    Note:  VM received from pt, requesting return call.   TC with pt, stating she received results letter from RANDALL that she has osteoporosis. Pt stated she has questions about this diagnosis; advised telehealth visit to discuss her questions; pt agreed and was transferred to scheduling.    Last office visit and reason:  1/27/22 bruising ZIM

## 2022-03-02 NOTE — TELEPHONE ENCOUNTER
"---------------------  From: Arik Santana CMA   To: [facility] ([id number]_[facility]);     Sent: 1/26/2022 2:15:23 PM CST  Subject: General Message     Phone Message    PCP:   RANDALL      Time of Call:  1410       Person Calling:  self  Phone number:  [phone]    Reason for call:  se of covid booster  Returned call at: _    Note:   Pt calls in stating she received covid booster and her recent Px(1/24) Pt states she developed some swelling and \"tenderness\" around injection site that now radiates to Right axillary area that started yesterday.  Pt denies any fever,nausea or SOB.  Pt wants to know \"if this is normal\"  Pt also states she need Bone Density orders Faxed to the [facility].      Last office visit and reason:  1/24 Px    Transferred to: [employer]---------------------  From: Collette Sotomayor ([facility] Message Pool ([id number]_[facility]))   To: Eliz Lamas MD;     Sent: 1/26/2022 2:24:03 PM CST  Subject: FW: General Message     Please advise on injection site swelling.     Will place Dexa order and fax to [facility].Swelling and pain at injection site is common, and the injection can cause swelling of lymph nodes. I would recommend holding off on mammogram until symptoms resolve as it could cause a false positive on the mammogram.---------------------  From: Eliz Lamas MD   To: [facility] ([id number]_[facility]);     Sent: 1/26/2022 2:38:16 PM CST  Subject: RE: General MessagePt feeling better today. No longer having symptoms from at injection site.    Will wait a few weeks for mammo. Did tell pt to tell staff about swelling when having mammo done.     Dexa was faxed.  "

## 2022-03-04 ENCOUNTER — TELEPHONE (OUTPATIENT)
Dept: FAMILY MEDICINE | Facility: CLINIC | Age: 62
End: 2022-03-04
Payer: COMMERCIAL

## 2022-03-04 NOTE — TELEPHONE ENCOUNTER
Nutrition Education Scheduling Outreach #1:    Call to patient to schedule. Patient declined to schedule. Will call back next week.        Megan Garvin  [place] [facility]  Diabetes and Nutrition Scheduling

## 2022-03-11 ENCOUNTER — DOCUMENTATION ONLY (OUTPATIENT)
Dept: OTHER | Facility: CLINIC | Age: 62
End: 2022-03-11
Payer: COMMERCIAL

## 2022-03-18 NOTE — TELEPHONE ENCOUNTER
Reason for Call:  Dietician Referral     Detailed comments: Patient called wondering if she could see Lisa Scott in [place] in regards to her Osteoporosis. Instead of traveling to the [facility]. Patient would like someone to call and verify.    Phone Number Patient can be reached at: Home number on file [phone] (home)    Best Time: anytime    Can we leave a detailed message on this number? YES    Call taken on 3/18/2022 at 9:53 AM by Zoie Melvin

## 2022-03-30 ENCOUNTER — TELEPHONE (OUTPATIENT)
Dept: EDUCATION SERVICES | Facility: CLINIC | Age: 62
End: 2022-03-30
Payer: COMMERCIAL

## 2022-03-30 NOTE — TELEPHONE ENCOUNTER
Called pt and discussed recommendations for calcium, Vit D, and exercise.    If pt would like more detailed information she will set up appointment.

## 2022-03-30 NOTE — TELEPHONE ENCOUNTER
Reason for Call:  Other Questions    Detailed comments: Patient has questions regarding her diet, osteoporosis, and if she should make an appointment    Phone Number Patient can be reached at: Cell number on file:    Telephone Information:   Mobile [phone]       Best Time: any      Can we leave a detailed message on this number? YES    Call taken on 3/30/2022 at 4:00 PM by PEPE AGUIAR

## 2022-04-03 ENCOUNTER — HEALTH MAINTENANCE LETTER (OUTPATIENT)
Age: 62
End: 2022-04-03

## 2022-06-15 ENCOUNTER — OFFICE VISIT (OUTPATIENT)
Dept: FAMILY MEDICINE | Facility: CLINIC | Age: 62
End: 2022-06-15
Payer: COMMERCIAL

## 2022-06-15 VITALS
HEART RATE: 34 BPM | WEIGHT: 122.3 LBS | BODY MASS INDEX: 20.99 KG/M2 | DIASTOLIC BLOOD PRESSURE: 82 MMHG | OXYGEN SATURATION: 96 % | SYSTOLIC BLOOD PRESSURE: 130 MMHG

## 2022-06-15 DIAGNOSIS — R53.83 FATIGUE, UNSPECIFIED TYPE: Primary | ICD-10-CM

## 2022-06-15 DIAGNOSIS — M79.662 PAIN IN BOTH LOWER LEGS: ICD-10-CM

## 2022-06-15 DIAGNOSIS — M79.661 PAIN IN BOTH LOWER LEGS: ICD-10-CM

## 2022-06-15 LAB
ALBUMIN SERPL-MCNC: 3.9 G/DL (ref 3.4–5)
ALP SERPL-CCNC: 61 U/L (ref 40–150)
ALT SERPL W P-5'-P-CCNC: 28 U/L (ref 0–50)
ANION GAP SERPL CALCULATED.3IONS-SCNC: 6 MMOL/L (ref 3–14)
AST SERPL W P-5'-P-CCNC: 64 U/L (ref 0–45)
BILIRUB SERPL-MCNC: 0.5 MG/DL (ref 0.2–1.3)
BUN SERPL-MCNC: 28 MG/DL (ref 7–30)
CALCIUM SERPL-MCNC: 9.6 MG/DL (ref 8.5–10.1)
CHLORIDE BLD-SCNC: 106 MMOL/L (ref 94–109)
CO2 SERPL-SCNC: 27 MMOL/L (ref 20–32)
CREAT SERPL-MCNC: 0.76 MG/DL (ref 0.52–1.04)
ERYTHROCYTE [DISTWIDTH] IN BLOOD BY AUTOMATED COUNT: 12.5 % (ref 10–15)
GFR SERPL CREATININE-BSD FRML MDRD: 87 ML/MIN/1.73M2
GLUCOSE BLD-MCNC: 89 MG/DL (ref 70–99)
HCT VFR BLD AUTO: 45.5 % (ref 35–47)
HGB BLD-MCNC: 14.6 G/DL (ref 11.7–15.7)
MAGNESIUM SERPL-MCNC: 2.3 MG/DL (ref 1.6–2.3)
MCH RBC QN AUTO: 30.9 PG (ref 26.5–33)
MCHC RBC AUTO-ENTMCNC: 32.1 G/DL (ref 31.5–36.5)
MCV RBC AUTO: 96 FL (ref 78–100)
PLATELET # BLD AUTO: 281 10E3/UL (ref 150–450)
POTASSIUM BLD-SCNC: 4 MMOL/L (ref 3.4–5.3)
PROT SERPL-MCNC: 7.1 G/DL (ref 6.8–8.8)
RBC # BLD AUTO: 4.73 10E6/UL (ref 3.8–5.2)
SODIUM SERPL-SCNC: 139 MMOL/L (ref 133–144)
TSH SERPL DL<=0.005 MIU/L-ACNC: 2.72 MU/L (ref 0.4–4)
VIT B12 SERPL-MCNC: 566 PG/ML (ref 193–986)
WBC # BLD AUTO: 7.6 10E3/UL (ref 4–11)

## 2022-06-15 PROCEDURE — 80053 COMPREHEN METABOLIC PANEL: CPT | Performed by: FAMILY MEDICINE

## 2022-06-15 PROCEDURE — 99213 OFFICE O/P EST LOW 20 MIN: CPT | Performed by: FAMILY MEDICINE

## 2022-06-15 PROCEDURE — 36415 COLL VENOUS BLD VENIPUNCTURE: CPT | Performed by: FAMILY MEDICINE

## 2022-06-15 PROCEDURE — 84443 ASSAY THYROID STIM HORMONE: CPT | Performed by: FAMILY MEDICINE

## 2022-06-15 PROCEDURE — 83735 ASSAY OF MAGNESIUM: CPT | Performed by: FAMILY MEDICINE

## 2022-06-15 PROCEDURE — 85027 COMPLETE CBC AUTOMATED: CPT | Performed by: FAMILY MEDICINE

## 2022-06-15 PROCEDURE — 82607 VITAMIN B-12: CPT | Performed by: FAMILY MEDICINE

## 2022-06-15 ASSESSMENT — ENCOUNTER SYMPTOMS
GASTROINTESTINAL NEGATIVE: 1
RESPIRATORY NEGATIVE: 1
CARDIOVASCULAR NEGATIVE: 1
ALLERGIC/IMMUNOLOGIC NEGATIVE: 1
FATIGUE: 1
PSYCHIATRIC NEGATIVE: 1
EYES NEGATIVE: 1
ENDOCRINE NEGATIVE: 1
NEUROLOGICAL NEGATIVE: 1
HEMATOLOGIC/LYMPHATIC NEGATIVE: 1
MUSCULOSKELETAL NEGATIVE: 1

## 2022-06-15 NOTE — PROGRESS NOTES
Assessment & Plan     Fatigue, unspecified type  Patient with intermittent episodes of fatigue that are a lack of energy or weariness.  Does not get sleepy with these episodes.  Have recommended that she keep track of the frequency of episodes and her diet and sleep around that time.  We will check labs as ordered.  - TSH  - CBC with platelets  - Comprehensive metabolic panel  - Vitamin B12  - Magnesium    Pain in both lower legs  Patient with pain in lower legs that sometimes disrupt sleep.  We will check a vitamin B12 and magnesium along with other labs as ordered.  May be related to activity.  Encourage hydration and stretching.  - TSH  - CBC with platelets  - Comprehensive metabolic panel  - Vitamin B12  - Magnesium                 No follow-ups on file.    Eliz Lamas MD  [facility]    Dora Shelton is a 62 year old, presenting for the following health issues:  Fatigue (Feels better now, but when she made appointment, she was very tired.)      Patient with intermittent episodes of fatigue, feels like a lack of energy. Lasted about 3-4 days, usually less than 2 days. Worse with activity/exertion. Happens every couple of months.    Does intermittently have nights that she does not sleep well, sometimes related to muscle achiness.  Patient has had aching and tingling in her legs intermittently.  Has been taking magnesium which seems to be may be helping a little bit.  Still does not feel like she is as healthy as she would like to be.  Worries about preventative or reversible conditions that are not found until they are irreversible.    Using supplements.  We reviewed these together.  She takes fish oil, probiotic, calcium and vitamin D, multivitamin, vitamin C, additional vitamin D, and additional calcium with vitamin D.  In addition she has an aloe vera oral that she takes.  Reviewed that the 1 calcium and vitamin D that has calcium hydropiratate and the probiotic fish  oil are likely reasonable.  Given her healthy diet I do not think she needs the vitamin C or additional multivitamin.  She also does not need as much calcium with vitamin D as she has been taking.    Fatigue  Associated symptoms include fatigue.   History of Present Illness       Reason for visit:  Feeling tired    She eats 2-3 servings of fruits and vegetables daily.She consumes 0 sweetened beverage(s) daily.She exercises with enough effort to increase her heart rate 30 to 60 minutes per day.  She exercises with enough effort to increase her heart rate 5 days per week.   She is taking medications regularly.             Review of Systems   Constitutional: Positive for fatigue.   HENT: Negative.    Eyes: Negative.    Respiratory: Negative.    Cardiovascular: Negative.    Gastrointestinal: Negative.    Endocrine: Negative.    Breasts:  negative.    Genitourinary: Negative.    Musculoskeletal: Negative.    Skin: Negative.    Allergic/Immunologic: Negative.    Neurological: Negative.    Hematological: Negative.    Psychiatric/Behavioral: Negative.             Objective    /82   Pulse (!) 34   Wt 55.5 kg (122 lb 4.8 oz)   SpO2 96%   BMI 20.99 kg/m    Body mass index is 20.99 kg/m .  Physical Exam   GENERAL: healthy, alert and no distress  PSYCH: mentation appears normal, affect normal/bright                    .  ..

## 2022-06-15 NOTE — LETTER
June 16, 2022      Cat VINNY [place]uman   [facility] [place] [address]        Dear Ms.Elliott Bauman,    We are writing to inform you of your test results.    I will try to reach you by phone to see if you have questions. Overall these results are normal but one liver function test is just very slightly elevated. I would recommend we recheck liver tests in 3 months.     Resulted Orders   TSH   Result Value Ref Range    TSH 2.72 0.40 - 4.00 mU/L   CBC with platelets   Result Value Ref Range    WBC Count 7.6 4.0 - 11.0 10e3/uL    RBC Count 4.73 3.80 - 5.20 10e6/uL    Hemoglobin 14.6 11.7 - 15.7 g/dL    Hematocrit 45.5 35.0 - 47.0 %    MCV 96 78 - 100 fL    MCH 30.9 26.5 - 33.0 pg    MCHC 32.1 31.5 - 36.5 g/dL    RDW 12.5 10.0 - 15.0 %    Platelet Count 281 150 - 450 10e3/uL   Comprehensive metabolic panel   Result Value Ref Range    Sodium 139 133 - 144 mmol/L    Potassium 4.0 3.4 - 5.3 mmol/L    Chloride 106 94 - 109 mmol/L    Carbon Dioxide (CO2) 27 20 - 32 mmol/L    Anion Gap 6 3 - 14 mmol/L    Urea Nitrogen 28 7 - 30 mg/dL    Creatinine 0.76 0.52 - 1.04 mg/dL    Calcium 9.6 8.5 - 10.1 mg/dL    Glucose 89 70 - 99 mg/dL    Alkaline Phosphatase 61 40 - 150 U/L    AST 64 (H) 0 - 45 U/L    ALT 28 0 - 50 U/L    Protein Total 7.1 6.8 - 8.8 g/dL    Albumin 3.9 3.4 - 5.0 g/dL    Bilirubin Total 0.5 0.2 - 1.3 mg/dL    GFR Estimate 87 >60 mL/min/1.73m2      Comment:      Effective December 21, 2021 eGFRcr in adults is calculated using the 2021 CKD-EPI creatinine equation which includes age and gender (Rachel castrejon al., NEJM, DOI: 10.1056/OTQLjd5785420)   Vitamin B12   Result Value Ref Range    Vitamin B12 566 193 - 986 pg/mL   Magnesium   Result Value Ref Range    Magnesium 2.3 1.6 - 2.3 mg/dL       If you have any questions or concerns, please call the clinic at the number listed above.       Sincerely,      Eliz Lamas MD

## 2022-06-16 DIAGNOSIS — R74.8 ELEVATED LIVER ENZYMES: Primary | ICD-10-CM

## 2022-06-16 NOTE — PROGRESS NOTES
Reviewed lab results from 6/15 with patient.  Only abnormality of significance is a slightly elevated AST.  We will have her repeat a chemistry panel in 3 months.  Orders in chart.  She can come in and do a lab visit.  She will keep a record of any further episode she has and reach out if symptoms are getting worse or more frequent

## 2022-10-03 ENCOUNTER — HEALTH MAINTENANCE LETTER (OUTPATIENT)
Age: 62
End: 2022-10-03

## 2022-12-01 ENCOUNTER — MYC MEDICAL ADVICE (OUTPATIENT)
Dept: FAMILY MEDICINE | Facility: CLINIC | Age: 62
End: 2022-12-01

## 2022-12-01 DIAGNOSIS — R74.8 ELEVATED LIVER ENZYMES: Primary | ICD-10-CM

## 2022-12-19 ENCOUNTER — LAB (OUTPATIENT)
Dept: LAB | Facility: CLINIC | Age: 62
End: 2022-12-19
Payer: COMMERCIAL

## 2022-12-19 DIAGNOSIS — R74.8 ELEVATED LIVER ENZYMES: ICD-10-CM

## 2022-12-19 LAB
ALBUMIN SERPL BCG-MCNC: 4.5 G/DL (ref 3.5–5.2)
ALP SERPL-CCNC: 63 U/L (ref 35–104)
ALT SERPL W P-5'-P-CCNC: 26 U/L (ref 10–35)
AST SERPL W P-5'-P-CCNC: 77 U/L (ref 10–35)
BILIRUB DIRECT SERPL-MCNC: <0.2 MG/DL (ref 0–0.3)
BILIRUB SERPL-MCNC: 0.6 MG/DL
PROT SERPL-MCNC: 7.3 G/DL (ref 6.4–8.3)

## 2022-12-19 PROCEDURE — 80076 HEPATIC FUNCTION PANEL: CPT

## 2022-12-19 PROCEDURE — 36415 COLL VENOUS BLD VENIPUNCTURE: CPT

## 2022-12-20 NOTE — TELEPHONE ENCOUNTER
Pt requesting US to be completed at [facility]. Please assist with placing order; writer unsure which order to use.

## 2022-12-21 NOTE — TELEPHONE ENCOUNTER
Ultrasound order placed for external referral to [facility].  Should be faxed over to [facility] by tomorrow.  If she does not hear from them she can call [phone] to get that scheduled but would recommend she wait until tomorrow to give us time to get the order faxed to them.

## 2023-01-06 ENCOUNTER — MYC MEDICAL ADVICE (OUTPATIENT)
Dept: FAMILY MEDICINE | Facility: CLINIC | Age: 63
End: 2023-01-06

## 2023-01-11 ENCOUNTER — TELEPHONE (OUTPATIENT)
Dept: FAMILY MEDICINE | Facility: CLINIC | Age: 63
End: 2023-01-11

## 2023-01-11 NOTE — TELEPHONE ENCOUNTER
Please call patient.  I do not know that the garlic taste is related to her elevated liver function tests.  I believe she is was to be coming in to see me later this month for her annual exam.  My plan was to discuss the liver function tests in more detail at that time and talk about whether getting her set up to see someone who is a liver specialist would be a good idea.  Please let me know if there is something more urgent for me to follow-up.  Thanks

## 2023-01-11 NOTE — TELEPHONE ENCOUNTER
Writer called patient and relayed message from Dr. Lamas.    Offered patient a office visit now for current symptoms, or to be reviewed at her yearly preventative visit.     Patient would prefer to wait until her preventative visit.    Preventative visit scheduled for 1/25/23.    ROXANNE Cline

## 2023-01-11 NOTE — TELEPHONE ENCOUNTER
1-11-23  Dr Knutson called to report pt called her re: elevated AST levels & having a  taste of garlic in mouth x6 weeks all the time.  DR Velasquez wants to know If garlic taste is related to ast josé antonio

## 2023-01-25 ENCOUNTER — OFFICE VISIT (OUTPATIENT)
Dept: FAMILY MEDICINE | Facility: CLINIC | Age: 63
End: 2023-01-25
Payer: COMMERCIAL

## 2023-01-25 VITALS
HEART RATE: 56 BPM | WEIGHT: 124 LBS | DIASTOLIC BLOOD PRESSURE: 82 MMHG | HEIGHT: 65 IN | RESPIRATION RATE: 12 BRPM | OXYGEN SATURATION: 100 % | SYSTOLIC BLOOD PRESSURE: 144 MMHG | TEMPERATURE: 97.7 F | BODY MASS INDEX: 20.66 KG/M2

## 2023-01-25 DIAGNOSIS — Z12.11 SCREEN FOR COLON CANCER: ICD-10-CM

## 2023-01-25 DIAGNOSIS — G25.81 RESTLESS LEGS SYNDROME: ICD-10-CM

## 2023-01-25 DIAGNOSIS — Z11.4 SCREENING FOR HIV (HUMAN IMMUNODEFICIENCY VIRUS): ICD-10-CM

## 2023-01-25 DIAGNOSIS — Z00.00 ROUTINE GENERAL MEDICAL EXAMINATION AT A HEALTH CARE FACILITY: Primary | ICD-10-CM

## 2023-01-25 DIAGNOSIS — R74.8 ELEVATED LIVER ENZYMES: ICD-10-CM

## 2023-01-25 DIAGNOSIS — K58.9 IRRITABLE BOWEL SYNDROME, UNSPECIFIED TYPE: ICD-10-CM

## 2023-01-25 DIAGNOSIS — Z11.59 NEED FOR HEPATITIS C SCREENING TEST: ICD-10-CM

## 2023-01-25 DIAGNOSIS — R43.2 ABNORMAL TASTE IN MOUTH: ICD-10-CM

## 2023-01-25 DIAGNOSIS — M16.0 BILATERAL HIP JOINT ARTHRITIS: ICD-10-CM

## 2023-01-25 PROCEDURE — 99214 OFFICE O/P EST MOD 30 MIN: CPT | Mod: 25 | Performed by: FAMILY MEDICINE

## 2023-01-25 PROCEDURE — 99396 PREV VISIT EST AGE 40-64: CPT | Performed by: FAMILY MEDICINE

## 2023-01-25 RX ORDER — LATANOPROST 50 UG/ML
SOLUTION/ DROPS OPHTHALMIC
COMMUNITY
Start: 2022-10-24

## 2023-01-25 RX ORDER — TIMOLOL MALEATE 5 MG/ML
SOLUTION/ DROPS OPHTHALMIC
COMMUNITY
Start: 2022-12-26

## 2023-01-25 ASSESSMENT — ENCOUNTER SYMPTOMS
ENDOCRINE NEGATIVE: 1
ALLERGIC/IMMUNOLOGIC NEGATIVE: 1
HEMATOLOGIC/LYMPHATIC NEGATIVE: 1
CONSTITUTIONAL NEGATIVE: 1
CARDIOVASCULAR NEGATIVE: 1
RESPIRATORY NEGATIVE: 1
NEUROLOGICAL NEGATIVE: 1
EYES NEGATIVE: 1
MUSCULOSKELETAL NEGATIVE: 1
PSYCHIATRIC NEGATIVE: 1

## 2023-01-25 NOTE — PATIENT INSTRUCTIONS
1. Garlic taste - check to see if supplements are contributing. If continues, consider CT to check sinuses.   2. Liver tests - repeat in June, if stable monitor, if increasing consider gastroenterologist referral  3. Leg pain - try iron supplement, PT may also help  4. Hip arthritis - PT referral, call to schedule  5. Stomach pain - try FODMAP diet, can consider dietician referral in the future               Preventive Health Recommendations  Female Ages 50 - 64    Yearly exam: See your health care provider every year in order to  Review health changes.   Discuss preventive care.    Review your medicines if your doctor has prescribed any.    Get a Pap test every three years (unless you have an abnormal result and your provider advises testing more often).  If you get Pap tests with HPV test, you only need to test every 5 years, unless you have an abnormal result.   You do not need a Pap test if your uterus was removed (hysterectomy) and you have not had cancer.  You should be tested each year for STDs (sexually transmitted diseases) if you're at risk.   Have a mammogram every 1 to 2 years.  Have a colonoscopy at age 50, or have a yearly FIT test (stool test). These exams screen for colon cancer.    Have a cholesterol test every 5 years, or more often if advised.  Have a diabetes test (fasting glucose) every three years. If you are at risk for diabetes, you should have this test more often.   If you are at risk for osteoporosis (brittle bone disease), think about having a bone density scan (DEXA).    Shots: Get a flu shot each year. Get a tetanus shot every 10 years.    Nutrition:   Eat at least 5 servings of fruits and vegetables each day.  Eat whole-grain bread, whole-wheat pasta and brown rice instead of white grains and rice.  Get adequate Calcium and Vitamin D.     Lifestyle  Exercise at least 150 minutes a week (30 minutes a day, 5 days a week). This will help you control your weight and prevent disease.  Limit  alcohol to one drink per day.  No smoking.   Wear sunscreen to prevent skin cancer.   See your dentist every six months for an exam and cleaning.  See your eye doctor every 1 to 2 years.

## 2023-01-25 NOTE — PROGRESS NOTES
SUBJECTIVE:   CC: Cat is an 62 year old who presents for preventive health visit.     Patient has been advised of split billing requirements and indicates understanding: Yes     1. Garlic taste in mouth -patient has noticed ongoing taste of garlic in her mouth.  Its not there when she first wakes up in the morning.  Will come on later in the day.  Does not seem to be related to anything she eats.  Does occasionally have some drainage down the back of her throat and occasional cough.  She is concerned about what could be causing this.  Also discussed liver test which shows persistent slight elevation and liver function as noted.  Up slightly but overall stable.   2. Leg pain discussed.  Only happens at night.  Will get an aching that goes all the way down her leg.  Does not notice symptoms when she gets up and moves.  Stays mostly on the outside of her legs.  Not interfering with activity.  3. Hip arthritis discussed.  Patient has known arthritis diagnosed by orthopedic visit.  She is noting decreased range of motion and more difficulty with exercise.  Is worried about losing function.  Has tried home exercises without benefit.  4. FODMAP diet discussed.  Patient has noticed chronic abdominal pain.  Seems to be food related.  Has not been able to determine what causes it.  No change in bowel movements.  No vomiting.  No clear heartburn.    Healthy Habits:     Getting at least 3 servings of Calcium per day:  Yes    Bi-annual eye exam:  Yes    Dental care twice a year:  NO    Sleep apnea or symptoms of sleep apnea:  None    Diet:  Regular (no restrictions)    Frequency of exercise:  6-7 days/week    Duration of exercise:  Greater than 60 minutes    Taking medications regularly:  0    Barriers to taking medications:  Not applicable    Medication side effects:  Not applicable    PHQ-2 Total Score: 0    Additional concerns today:  Yes  History of Present Illness       Reason for visit:  Physical    She eats 2-3  servings of fruits and vegetables daily.She consumes 0 sweetened beverage(s) daily.She exercises with enough effort to increase her heart rate 30 to 60 minutes per day.  She exercises with enough effort to increase her heart rate 7 days per week.   She is taking medications regularly.  She is not taking prescribed medications regularly due to Not applicable.        Today's PHQ-2 Score:   PHQ-2 ( 1999 [employer]) 1/25/2023   Q1: Little interest or pleasure in doing things 0   Q2: Feeling down, depressed or hopeless 0   PHQ-2 Score 0   Q1: Little interest or pleasure in doing things Not at all   Q2: Feeling down, depressed or hopeless Not at all   PHQ-2 Score 0       Social History     Tobacco Use     Smoking status: Never     Smokeless tobacco: Never   Substance Use Topics     Alcohol use: Not on file     If you drink alcohol do you typically have >3 drinks per day or >7 drinks per week? No    Alcohol Use 1/25/2023   Prescreen: >3 drinks/day or >7 drinks/week? -   AUDIT SCORE  1     AUDIT - Alcohol Use Disorders Identification Test - Reproduced from the World Health Organization Audit 2001 (Second Edition) 1/25/2023   1.  How often do you have a drink containing alcohol? Monthly or less   2.  How many drinks containing alcohol do you have on a typical day when you are drinking? 1 or 2   3.  How often do you have five or more drinks on one occasion? Never   4.  How often during the last year have you found that you were not able to stop drinking once you had started? Never   5.  How often during the last year have you failed to do what was normally expected of you because of drinking? Never   6.  How often during the last year have you needed a first drink in the morning to get yourself going after a heavy drinking session? Never   7.  How often during the last year have you had a feeling of guilt or remorse after drinking? Never   8.  How often during the last year have you been unable to remember what happened the night  "before because of your drinking? Never   9.  Have you or someone else been injured because of your drinking? No   10. Has a relative, friend, doctor or other health care worker been concerned about your drinking or suggested you cut down? No   TOTAL SCORE 1       Reviewed orders with patient.  Reviewed health maintenance and updated orders accordingly - Yes      Breast Cancer Screening:    Breast CA Risk Assessment (FHS-7) 1/25/2023 1/25/2023   Do you have a family history of breast, colon, or ovarian cancer? No / Unknown No / Unknown         Recommend annual mammogram  Pertinent mammograms are reviewed under the imaging tab.    History of abnormal Pap smear: NO - age 30-65 PAP every 5 years with negative HPV co-testing recommended     Reviewed and updated as needed this visit by clinical staff   Tobacco  Allergies  Meds              Reviewed and updated as needed this visit by Provider                     Review of Systems   Constitutional: Negative.    Eyes: Negative.    Respiratory: Negative.    Cardiovascular: Negative.    Endocrine: Negative.    Breasts:  negative.    Genitourinary: Negative.    Musculoskeletal: Negative.    Skin: Negative.    Allergic/Immunologic: Negative.    Neurological: Negative.    Hematological: Negative.    Psychiatric/Behavioral: Negative.    All other systems reviewed and are negative.         OBJECTIVE:   BP (!) 144/82   Pulse 56   Temp 97.7  F (36.5  C)   Resp 12   Ht 1.645 m (5' 4.75\")   Wt 56.2 kg (124 lb)   SpO2 100%   BMI 20.79 kg/m    Physical Exam  GENERAL: healthy, alert and no distress  EYES: Eyes grossly normal to inspection, PERRL and conjunctivae and sclerae normal  HENT: ear canals and TM's normal, nose and mouth without ulcers or lesions  NECK: no adenopathy, no asymmetry, masses, or scars and thyroid normal to palpation  RESP: lungs clear to auscultation - no rales, rhonchi or wheezes  BREAST: normal without masses, tenderness or nipple discharge and no " palpable axillary masses or adenopathy  CV: regular rate and rhythm, normal S1 S2, no S3 or S4, no murmur, click or rub, no peripheral edema and peripheral pulses strong  ABDOMEN: soft, nontender, no hepatosplenomegaly, no masses and bowel sounds normal  MS: no gross musculoskeletal defects noted, no edema  SKIN: no suspicious lesions or rashes  NEURO: Normal strength and tone, mentation intact and speech normal  PSYCH: mentation appears normal, affect normal/bright        ASSESSMENT/PLAN:   Routine general medical examination at a health care facility  Health maintenance updated, preventive services reviewed, vaccines discussed, questions are answered, patient encouraged to continue annual wellness visits to review preventive services      Screen for colon cancer  Patient will be sent for Cologuard for screening purposes  - COLOGUARD([employer]); Future    Screening for HIV (human immunodeficiency virus)  Will do screening HIV test with lab work this summer  - HIV Antigen Antibody Combo; Future    Need for hepatitis C screening test  Will do screening hepatitis C test with lab work this summer  - Hepatitis C Screen Reflex to HCV RNA Quant and Genotype; Future    Bilateral hip joint arthritis  Patient with bilateral hip discomfort previously diagnosed by Ortho, worried about losing function.  Will refer to physical therapy for evaluation.  - Physical Therapy Referral; Future    Restless legs syndrome  Nighttime symptoms are suspicious for restless leg syndrome.  Discussed this with patient and will try adding iron to her daily vitamins.  If not getting better she will let me know.  Could also be musculoskeletal although unusual that it is bilateral.  Could also be neurologic.  Consider EMG or other work-up if not getting better on iron    Irritable bowel syndrome, unspecified type  Patient with generalized abdominal discomfort that is diet related.  We discussed FODMAP diet at length.  If not improving with  dietary changes could also consider dietitian evaluation.    Elevated liver enzymes  Patient with persistent mildly elevated liver function test.  Discussed option of referral to gastroenterology versus repeating testing.  Her plan is to repeat again in June.  If still elevated will need to reconsider next steps    Abnormal taste in mouth  Patient with abnormal taste in mouth.  Doubt reflux related as symptoms are actually best when she first wakes up in the morning.  She is taking multiple supplements.  I am wondering if one of them may be contributing to the symptoms.  I have recommended that she discontinue all of her supplements or try going off 1 at a time to see if one of them may be causing the symptoms.  She will give that a try.  If that is not effective consider scanning for undiagnosed sinus disease given chronic postnasal drainage.  She will also be seeing her dentist soon to rule out any dental abnormality.      Patient has been advised of split billing requirements and indicates understanding: Yes      COUNSELING:  Reviewed preventive health counseling, as reflected in patient instructions       Healthy diet/nutrition       Osteoporosis prevention/bone health       Colorectal Cancer Screening       Sexual health        She reports that she has never smoked. She has never used smokeless tobacco.          Eliz Lamas MD  [facility]

## 2023-01-31 ENCOUNTER — TRANSFERRED RECORDS (OUTPATIENT)
Dept: HEALTH INFORMATION MANAGEMENT | Facility: CLINIC | Age: 63
End: 2023-01-31

## 2023-02-02 ENCOUNTER — TRANSFERRED RECORDS (OUTPATIENT)
Dept: HEALTH INFORMATION MANAGEMENT | Facility: CLINIC | Age: 63
End: 2023-02-02

## 2023-02-25 ENCOUNTER — LAB (OUTPATIENT)
Dept: FAMILY MEDICINE | Facility: CLINIC | Age: 63
End: 2023-02-25
Payer: COMMERCIAL

## 2023-02-25 DIAGNOSIS — Z12.11 SCREEN FOR COLON CANCER: ICD-10-CM

## 2023-03-22 DIAGNOSIS — R74.8 ELEVATED LIVER ENZYMES: ICD-10-CM

## 2023-03-22 DIAGNOSIS — R43.2 ABNORMAL TASTE IN MOUTH: Primary | ICD-10-CM

## 2023-03-29 LAB — NONINV COLON CA DNA+OCC BLD SCRN STL QL: NEGATIVE

## 2023-04-27 ENCOUNTER — TELEPHONE (OUTPATIENT)
Dept: FAMILY MEDICINE | Facility: CLINIC | Age: 63
End: 2023-04-27
Payer: COMMERCIAL

## 2023-05-03 ENCOUNTER — OFFICE VISIT (OUTPATIENT)
Dept: FAMILY MEDICINE | Facility: CLINIC | Age: 63
End: 2023-05-03
Payer: COMMERCIAL

## 2023-05-03 VITALS
DIASTOLIC BLOOD PRESSURE: 80 MMHG | WEIGHT: 123.9 LBS | BODY MASS INDEX: 20.78 KG/M2 | OXYGEN SATURATION: 98 % | HEART RATE: 59 BPM | SYSTOLIC BLOOD PRESSURE: 132 MMHG | RESPIRATION RATE: 12 BRPM

## 2023-05-03 DIAGNOSIS — K21.9 GASTROESOPHAGEAL REFLUX DISEASE, UNSPECIFIED WHETHER ESOPHAGITIS PRESENT: Primary | ICD-10-CM

## 2023-05-03 PROCEDURE — 90472 IMMUNIZATION ADMIN EACH ADD: CPT | Performed by: FAMILY MEDICINE

## 2023-05-03 PROCEDURE — 99213 OFFICE O/P EST LOW 20 MIN: CPT | Mod: 25 | Performed by: FAMILY MEDICINE

## 2023-05-03 PROCEDURE — 90632 HEPA VACCINE ADULT IM: CPT | Performed by: FAMILY MEDICINE

## 2023-05-03 PROCEDURE — 90471 IMMUNIZATION ADMIN: CPT | Performed by: FAMILY MEDICINE

## 2023-05-03 PROCEDURE — 90746 HEPB VACCINE 3 DOSE ADULT IM: CPT | Performed by: FAMILY MEDICINE

## 2023-05-03 ASSESSMENT — ENCOUNTER SYMPTOMS
PSYCHIATRIC NEGATIVE: 1
RESPIRATORY NEGATIVE: 1
HEMATOLOGIC/LYMPHATIC NEGATIVE: 1
CARDIOVASCULAR NEGATIVE: 1
ENDOCRINE NEGATIVE: 1
NEUROLOGICAL NEGATIVE: 1
ALLERGIC/IMMUNOLOGIC NEGATIVE: 1
FATIGUE: 1
MUSCULOSKELETAL NEGATIVE: 1
GASTROINTESTINAL NEGATIVE: 1
EYES NEGATIVE: 1

## 2023-05-03 NOTE — PROGRESS NOTES
Assessment & Plan   Problem List Items Addressed This Visit    None  Visit Diagnoses     Gastroesophageal reflux disease, unspecified whether esophagitis present    -  Primary         Patient here to discuss testing for GERD and vaccines Will do Hepatitis vaccines today. Continue follow up with GI. Finish 2 week course of PPI.                  Eliz Lamas MD  [facility]    Dora Shelton is a 63 year old, presenting for the following health issues:  Fatigue and Musculoskeletal Problem (Mainly Right Leg - but can be bilateral, outer side of leg.)        5/3/2023     4:15 PM   Additional Questions   Roomed by Ml CHRISTIANSON CMA   Accompanied by None     Patient with significant fatigue last week. Was having a hard time doing chores or getting around.    Patient also having leg pain, down outside of leg. Worst at night. Better when laying flat.         Fatigue  Associated symptoms include fatigue.   Musculoskeletal Problem  Associated symptoms include fatigue.   History of Present Illness       Reason for visit:  Fatigue   leg pain    She eats 2-3 servings of fruits and vegetables daily.She consumes 0 sweetened beverage(s) daily.She exercises with enough effort to increase her heart rate 30 to 60 minutes per day.  She exercises with enough effort to increase her heart rate 7 days per week.   She is taking medications regularly.       Patient with GI symptoms, had consultation, here to review recommendations. Will have EGD    Discussed symptoms of GERD. On PPI x 2 weeks. Continuing to follow with GI. Reviewed labs and notes in [facility].           Review of Systems   Constitutional: Positive for fatigue.   HENT: Negative.    Eyes: Negative.    Respiratory: Negative.    Cardiovascular: Negative.    Gastrointestinal: Negative.    Endocrine: Negative.    Breasts:  negative.    Genitourinary: Negative.    Musculoskeletal: Negative.    Skin: Negative.    Allergic/Immunologic:  Negative.    Neurological: Negative.    Hematological: Negative.    Psychiatric/Behavioral: Negative.             Objective    /80   Pulse 59   Resp 12   Wt 56.2 kg (123 lb 14.4 oz)   LMP  (LMP Unknown)   SpO2 98%   BMI 20.78 kg/m    Body mass index is 20.78 kg/m .  Physical Exam   GENERAL: healthy, alert and no distress

## 2023-05-22 ENCOUNTER — TRANSFERRED RECORDS (OUTPATIENT)
Dept: HEALTH INFORMATION MANAGEMENT | Facility: CLINIC | Age: 63
End: 2023-05-22

## 2023-06-19 ENCOUNTER — TELEPHONE (OUTPATIENT)
Dept: FAMILY MEDICINE | Facility: CLINIC | Age: 63
End: 2023-06-19
Payer: COMMERCIAL

## 2023-06-19 NOTE — TELEPHONE ENCOUNTER
Patient Returning Call    Reason for call: Patient said she got a message about getting some labs done.    Patient has additional questions:  Yes    What are your questions/concerns:  Not sure if she needs certain labs repeated or not so wanted to go over before she made an appt.    Who does the patient want to speak with: Dr. Lamas or care team    Could we send this information to you in [employer] or would you prefer to receive a phone call?:   Patient would prefer a phone call   Okay to leave a detailed message?: Yes at Cell number on file:    Telephone Information:   Mobile [phone]

## 2024-01-17 ENCOUNTER — NURSE TRIAGE (OUTPATIENT)
Dept: NURSING | Facility: CLINIC | Age: 64
End: 2024-01-17
Payer: COMMERCIAL

## 2024-01-17 NOTE — TELEPHONE ENCOUNTER
Pt is calling wanting to schedule and flu shot - pt transferred to scheduling     No triage     Mita Sow RN  [place] Nurse Advisor  10:24 AM 1/17/2024    Reason for Disposition   Health Information question, no triage required and triager able to answer question    Additional Information   Negative: [1] Caller is not with the adult (patient) AND [2] reporting urgent symptoms   Negative: Lab result questions   Negative: Medication questions   Negative: Caller can't be reached by phone   Negative: Caller has already spoken to PCP or another triager   Negative: RN needs further essential information from caller in order to complete triage   Negative: Requesting regular office appointment   Negative: [1] Caller requesting NON-URGENT health information AND [2] PCP's office is the best resource    Protocols used: Information Only Call - [facility]

## 2024-01-18 ENCOUNTER — IMMUNIZATION (OUTPATIENT)
Dept: FAMILY MEDICINE | Facility: CLINIC | Age: 64
End: 2024-01-18
Payer: MEDICARE

## 2024-01-18 DIAGNOSIS — Z23 NEED FOR VACCINATION: Primary | ICD-10-CM

## 2024-01-18 PROCEDURE — 99207 PR NO CHARGE NURSE ONLY: CPT

## 2024-01-18 PROCEDURE — 90662 IIV NO PRSV INCREASED AG IM: CPT

## 2024-01-18 PROCEDURE — G0008 ADMIN INFLUENZA VIRUS VAC: HCPCS

## 2024-02-01 ENCOUNTER — OFFICE VISIT (OUTPATIENT)
Dept: FAMILY MEDICINE | Facility: CLINIC | Age: 64
End: 2024-02-01
Payer: COMMERCIAL

## 2024-02-01 VITALS
WEIGHT: 119 LBS | SYSTOLIC BLOOD PRESSURE: 110 MMHG | TEMPERATURE: 97.5 F | HEIGHT: 64 IN | BODY MASS INDEX: 20.32 KG/M2 | RESPIRATION RATE: 12 BRPM | HEART RATE: 56 BPM | DIASTOLIC BLOOD PRESSURE: 76 MMHG | OXYGEN SATURATION: 99 %

## 2024-02-01 DIAGNOSIS — Z00.00 ENCOUNTER FOR MEDICARE ANNUAL WELLNESS EXAM: Primary | ICD-10-CM

## 2024-02-01 PROCEDURE — G0402 INITIAL PREVENTIVE EXAM: HCPCS | Performed by: FAMILY MEDICINE

## 2024-02-01 SDOH — HEALTH STABILITY: PHYSICAL HEALTH: ON AVERAGE, HOW MANY DAYS PER WEEK DO YOU ENGAGE IN MODERATE TO STRENUOUS EXERCISE (LIKE A BRISK WALK)?: 4 DAYS

## 2024-02-01 ASSESSMENT — SOCIAL DETERMINANTS OF HEALTH (SDOH): HOW OFTEN DO YOU GET TOGETHER WITH FRIENDS OR RELATIVES?: ONCE A WEEK

## 2024-02-01 NOTE — PATIENT INSTRUCTIONS
Your Health Risk Assessment indicates you feel you are not in good health    A healthy lifestyle helps keep the body fit and the mind alert. It helps protect you from disease, helps you fight disease, and helps prevent chronic disease (disease that doesn't go away) from getting worse. This is important as you get older and begin to notice twinges in muscles and joints and a decline in the strength and stamina you once took for granted. A healthy lifestyle includes good healthcare, good nutrition, weight control, recreation, and regular exercise. Avoid harmful substances and do what you can to keep safe. Another part of a healthy lifestyle is stay mentally active and socially involved.    Good healthcare   Have a wellness visit every year.   If you have new symptoms, let us know right away. Don't wait until the next checkup.   Take medicines exactly as prescribed and keep your medicines in a safe place. Tell us if your medicine causes problems.   Healthy diet and weight control   Eat 3 or 4 small, nutritious, low-fat, high-fiber meals a day. Include a variety of fruits, vegetables, and whole-grain foods.   Make sure you get enough calcium in your diet. Calcium, vitamin D, and exercise help prevent osteoporosis (bone thinning).   If you live alone, try eating with others when you can. That way you get a good meal and have company while you eat it.   Try to keep a healthy weight. If you eat more calories than your body uses for energy, it will be stored as fat and you will gain weight.     Recreation   Recreation is not limited to sports and team events. It includes any activity that provides relaxation, interest, enjoyment, and exercise. Recreation provides an outlet for physical, mental, and social energy. It can give a sense of worth and achievement. It can help you stay healthy.    Mental Exercise and Social Involvement  Mental and emotional health is as important as physical health. Keep in touch with friends and  family. Stay as active as possible. Continue to learn and challenge yourself.   Things you can do to stay mentally active are:  Learn something new, like a foreign language or musical instrument.   Play SCRABBLE or do crossword puzzles. If you cannot find people to play these games with you at home, you can play them with others on your computer through the Internet.   Join a games club--anything from card games to chess or checkers or lawn bowling.   Start a new hobby.   Go back to school.   Volunteer.   Read.   Keep up with world events.  Eating Healthy Foods: Care Instructions  With every meal, you can make healthy food choices. Try to eat a variety of fruits, vegetables, whole grains, lean proteins, and low-fat dairy products. This can help you get the right balance of nutrients, including vitamins and minerals. Small changes add up over time. You can start by adding one healthy food to your meals each day.    Try to make half your plate fruits and vegetables, one-fourth whole grains, and one-fourth lean proteins. Try including dairy with your meals.   Eat more fruits and vegetables. Try to have them with most meals and snacks.   Foods for healthy eating    Fruits    These can be fresh, frozen, canned, or dried.  Try to choose whole fruit rather than fruit juice.  Eat a variety of colors.    Vegetables    These can be fresh, frozen, canned, or dried.  Beans, peas, and lentils count too.    Whole grains    Choose whole-grain breads, cereals, and noodles.  Try brown rice.    Lean proteins    These can include lean meat, poultry, fish, and eggs.  You can also have tofu, beans, peas, lentils, nuts, and seeds.    Dairy    Try milk, yogurt, and cheese.  Choose low-fat or fat-free when you can.  If you need to, use lactose-free milk or fortified plant-based milk products, such as soy milk.    Water    Drink water when you're thirsty.  Limit sugar-sweetened drinks, including soda, fruit drinks, and sports drinks.  Where  "can you learn more?  Go to https://www.[employer].net/patiented  Enter T756 in the search box to learn more about \"Eating Healthy Foods: Care Instructions.\"  Current as of: February 28, 2023               Content Version: 13.8    7927-8171 [employer].   Care instructions adapted under license by your healthcare professional. If you have questions about a medical condition or this instruction, always ask your healthcare professional. [employer] disclaims any warranty or liability for your use of this information.      Nutrition for Older Adults: Care Instructions  Overview     Good nutrition is important at any age. But it is especially important for older adults. Eating a healthy diet helps keep your body strong. And it can help lower your risk for disease.  As you get older, your body needs more of certain nutrients. These include vitamin B12, calcium, and vitamin D. But it may be harder for you to get these and other important nutrients. This could be for many reasons. You may not feel as hungry as you used to. Or you could have problems with your teeth or mouth that make it hard to chew. Or you may not enjoy planning and preparing meals, especially if you live alone.  Talk with your doctor if you want help getting the most nutrition from what you eat. The doctor may have you work with a dietitian to help you plan meals.  Follow-up care is a key part of your treatment and safety. Be sure to make and go to all appointments, and call your doctor if you are having problems. It's also a good idea to know your test results and keep a list of the medicines you take.  How can you care for yourself at home?  To stay healthy  Eat a variety of foods. The more you vary the foods you eat, the more vitamins, minerals, and other nutrients you get.  Take a multivitamin every day. Choose one with about 100% of the daily value (DV) for vitamins and minerals. Do not take more than 100% of the daily value " for any vitamin or mineral unless your doctor tells you to. Talk with your doctor if you are not sure which multivitamin is right for you.  Eat lots of fruits and vegetables. Fresh, frozen, or no-salt canned vegetables and fruits in their own juice or light syrup are good choices.  Include foods that are high in vitamin B12 in your diet. Good choices are fortified breakfast cereal, nonfat or low-fat milk and other dairy products, meat, poultry, fish, and eggs.  Get enough calcium and vitamin D. Good choices include nonfat or low-fat milk, cheese, and yogurt. Other good options are tofu, orange juice with added calcium, and some leafy green vegetables, such as rossana greens and kale. If you don't use milk products, talk to your doctor about calcium and vitamin D supplements.  Eat protein foods every day. Good choices include lean meat, fish, poultry, eggs, and cheese. Other good options are cooked beans, peanut butter, and nuts and seeds.  Choose whole grains for half of the grains you eat. Look for 100% whole wheat bread, whole-grain cereals, brown rice, and other whole grains.  If you have constipation  Eat high-fiber foods every day. These include fruits, vegetables, cooked dried beans, and whole grains.  Drink plenty of fluids. If you have kidney, heart, or liver disease and have to limit fluids, talk with your doctor before you increase the amount of fluids you drink.  Ask your doctor if stool softeners may help keep your bowels regular.  If you have mouth problems that make chewing hard  Pick canned or cooked fruits and vegetables. These are often softer.  Chop or shred meat, poultry, and fish. Add sauce or gravy to the meat to help keep it moist.  Pick other protein foods that are soft. These include cheese, peanut butter, cooked beans, cottage cheese, and eggs.  If you have trouble shopping for yourself  Ask a local food store to deliver groceries to your home.  Contact a volunteer center and ask for  "help.  Ask a family member or neighbor to help you.  If you have trouble preparing meals  If you are able, take a cooking class.  Use a microwave oven to cook TV dinners and other frozen or prepared foods.  Take part in group meal programs. You can find these through senior citizen programs.  Have meals brought to your home. Your community may offer programs that deliver meals, such as Meals on Wheels.  If your appetite is poor  Try eating smaller amounts of food more often. For example, eat 4 or 5 small meals a day instead of 1 or 2 large meals.  Eat with family and friends. Or take part in group meal programs offered through volunteer programs. Eating with others may help your appetite. And it helps you be more social.  Ask your doctor if your medicines could cause appetite or taste problems. If so, ask about changing medicines.  Add spices and herbs to increase the flavor of food.  If you think you are depressed, ask your doctor for help. Depression can affect your appetite. And it can make it hard to do everyday activities like grocery shopping and making meals. Treatment can help.  When should you call for help?  Watch closely for changes in your health, and be sure to contact your doctor if you have any problems.  Where can you learn more?  Go to https://www.[employer].net/patiented  Enter L643 in the search box to learn more about \"Nutrition for Older Adults: Care Instructions.\"  Current as of: February 26, 2023               Content Version: 13.8    4024-8810 [employer].   Care instructions adapted under license by your healthcare professional. If you have questions about a medical condition or this instruction, always ask your healthcare professional. [employer] disclaims any warranty or liability for your use of this information.      Preventing Falls: Care Instructions  Injuries and health problems such as trouble walking or poor eyesight can increase your risk of falling. So " can some medicines. But there are things you can do to help prevent falls. You can exercise to get stronger. You can also arrange your home to make it safer.    Talk to your doctor about the medicines you take. Ask if any of them increase the risk of falls and whether they can be changed or stopped.   Try to exercise regularly. It can help improve your strength and balance. This can help lower your risk of falling.     Practice fall safety and prevention.    Wear low-heeled shoes that fit well and give your feet good support. Talk to your doctor if you have foot problems that make this hard.  Carry a cellphone or wear a medical alert device that you can use to call for help.  Use stepladders instead of chairs to reach high objects. Don't climb if you're at risk for falls. Ask for help, if needed.  Wear the correct eyeglasses, if you need them.    Make your home safer.    Remove rugs, cords, clutter, and furniture from walkways.  Keep your house well lit. Use night-lights in hallways and bathrooms.  Install and use sturdy handrails on stairways.  Wear nonskid footwear, even inside. Don't walk barefoot or in socks without shoes.    Be safe outside.    Use handrails, curb cuts, and ramps whenever possible.  Keep your hands free by using a shoulder bag or backpack.  Try to walk in well-lit areas. Watch out for uneven ground, changes in pavement, and debris.  Be careful in the winter. Walk on the grass or gravel when sidewalks are slippery. Use de-icer on steps and walkways. Add non-slip devices to shoes.    Put grab bars and nonskid mats in your shower or tub and near the toilet. Try to use a shower chair or bath bench when bathing.   Get into a tub or shower by putting in your weaker leg first. Get out with your strong side first. Have a phone or medical alert device in the bathroom with you.   Where can you learn more?  Go to https://www.[employer].net/patiented  Enter G117 in the search box to learn more about  "\"Preventing Falls: Care Instructions.\"  Current as of: July 18, 2023               Content Version: 13.8    4335-3366 [employer].   Care instructions adapted under license by your healthcare professional. If you have questions about a medical condition or this instruction, always ask your healthcare professional. [employer] disclaims any warranty or liability for your use of this information.      Learning About Stress  What is stress?     Stress is your body's response to a hard situation. Your body can have a physical, emotional, or mental response. Stress is a fact of life for most people, and it affects everyone differently. What causes stress for you may not be stressful for someone else.  A lot of things can cause stress. You may feel stress when you go on a job interview, take a test, or run a race. This kind of short-term stress is normal and even useful. It can help you if you need to work hard or react quickly. For example, stress can help you finish an important job on time.  Long-term stress is caused by ongoing stressful situations or events. Examples of long-term stress include long-term health problems, ongoing problems at work, or conflicts in your family. Long-term stress can harm your health.  How does stress affect your health?  When you are stressed, your body responds as though you are in danger. It makes hormones that speed up your heart, make you breathe faster, and give you a burst of energy. This is called the fight-or-flight stress response. If the stress is over quickly, your body goes back to normal and no harm is done.  But if stress happens too often or lasts too long, it can have bad effects. Long-term stress can make you more likely to get sick, and it can make symptoms of some diseases worse. If you tense up when you are stressed, you may develop neck, shoulder, or low back pain. Stress is linked to high blood pressure and heart disease.  Stress also harms " your emotional health. It can make you gomes, tense, or depressed. Your relationships may suffer, and you may not do well at work or school.  What can you do to manage stress?  You can try these things to help manage stress:   Do something active. Exercise or activity can help reduce stress. Walking is a great way to get started. Even everyday activities such as housecleaning or yard work can help.  Try yoga or christiano chi. These techniques combine exercise and meditation. You may need some training at first to learn them.  Do something you enjoy. For example, listen to music or go to a movie. Practice your hobby or do volunteer work.  Meditate. This can help you relax, because you are not worrying about what happened before or what may happen in the future.  Do guided imagery. Imagine yourself in any setting that helps you feel calm. You can use online videos, books, or a teacher to guide you.  Do breathing exercises. For example:  From a standing position, bend forward from the waist with your knees slightly bent. Let your arms dangle close to the floor.  Breathe in slowly and deeply as you return to a standing position. Roll up slowly and lift your head last.  Hold your breath for just a few seconds in the standing position.  Breathe out slowly and bend forward from the waist.  Let your feelings out. Talk, laugh, cry, and express anger when you need to. Talking with supportive friends or family, a counselor, or a deven leader about your feelings is a healthy way to relieve stress. Avoid discussing your feelings with people who make you feel worse.  Write. It may help to write about things that are bothering you. This helps you find out how much stress you feel and what is causing it. When you know this, you can find better ways to cope.  What can you do to prevent stress?  You might try some of these things to help prevent stress:  Manage your time. This helps you find time to do the things you want and need to  "do.  Get enough sleep. Your body recovers from the stresses of the day while you are sleeping.  Get support. Your family, friends, and community can make a difference in how you experience stress.  Limit your news feed. Avoid or limit time on social media or news that may make you feel stressed.  Do something active. Exercise or activity can help reduce stress. Walking is a great way to get started.  Where can you learn more?  Go to https://www.[employer].net/patiented  Enter N032 in the search box to learn more about \"Learning About Stress.\"  Current as of: February 26, 2023               Content Version: 13.8    0334-3427 [facility].   Care instructions adapted under license by your healthcare professional. If you have questions about a medical condition or this instruction, always ask your healthcare professional. [facility] disclaims any warranty or liability for your use of this information.      Learning About Sleeping Well  What does sleeping well mean?     Sleeping well means getting enough sleep to feel good and stay healthy. How much sleep is enough varies among people.  The number of hours you sleep and how you feel when you wake up are both important. If you do not feel refreshed, you probably need more sleep. Another sign of not getting enough sleep is feeling tired during the day.  Experts recommend that adults get at least 7 or more hours of sleep per day. Children and older adults need more sleep.  Why is getting enough sleep important?  Getting enough quality sleep is a basic part of good health. When your sleep suffers, your physical health, mood, and your thoughts can suffer too. You may find yourself feeling more grumpy or stressed. Not getting enough sleep also can lead to serious problems, including injury, accidents, anxiety, and depression.  What might cause poor sleeping?  Many things can cause sleep problems, including:  Changes to your sleep schedule.  Stress. " "Stress can be caused by fear about a single event, such as giving a speech. Or you may have ongoing stress, such as worry about work or school.  Depression, anxiety, and other mental or emotional conditions.  Changes in your sleep habits or surroundings. This includes changes that happen where you sleep, such as noise, light, or sleeping in a different bed. It also includes changes in your sleep pattern, such as having jet lag or working a late shift.  Health problems, such as pain, breathing problems, and restless legs syndrome.  Lack of regular exercise.  Using alcohol, nicotine, or caffeine before bed.  How can you help yourself?  Here are some tips that may help you sleep more soundly and wake up feeling more refreshed.  Your sleeping area   Use your bedroom only for sleeping and sex. A bit of light reading may help you fall asleep. But if it doesn't, do your reading elsewhere in the house. Try not to use your TV, computer, smartphone, or tablet while you are in bed.  Be sure your bed is big enough to stretch out comfortably, especially if you have a sleep partner.  Keep your bedroom quiet, dark, and cool. Use curtains, blinds, or a sleep mask to block out light. To block out noise, use earplugs, soothing music, or a \"white noise\" machine.  Your evening and bedtime routine   Create a relaxing bedtime routine. You might want to take a warm shower or bath, or listen to soothing music.  Go to bed at the same time every night. And get up at the same time every morning, even if you feel tired.  What to avoid   Limit caffeine (coffee, tea, caffeinated sodas) during the day, and don't have any for at least 6 hours before bedtime.  Avoid drinking alcohol before bedtime. Alcohol can cause you to wake up more often during the night.  Try not to smoke or use tobacco, especially in the evening. Nicotine can keep you awake.  Limit naps during the day, especially close to bedtime.  Avoid lying in bed awake for too long. If " "you can't fall asleep or if you wake up in the middle of the night and can't get back to sleep within about 20 minutes, get out of bed and go to another room until you feel sleepy.  Avoid taking medicine right before bed that may keep you awake or make you feel hyper or energized. Your doctor can tell you if your medicine may do this and if you can take it earlier in the day.  If you can't sleep   Imagine yourself in a peaceful, pleasant scene. Focus on the details and feelings of being in a place that is relaxing.  Get up and do a quiet or boring activity until you feel sleepy.  Avoid drinking any liquids before going to bed to help prevent waking up often to use the bathroom.  Where can you learn more?  Go to https://www.[employer].net/patiented  Enter J942 in the search box to learn more about \"Learning About Sleeping Well.\"  Current as of: July 11, 2023               Content Version: 13.8    3094-5081 [employer].   Care instructions adapted under license by your healthcare professional. If you have questions about a medical condition or this instruction, always ask your healthcare professional. [employer] disclaims any warranty or liability for your use of this information.      Preventive Care Advice   This is general advice given by our system to help you stay healthy. However, your care team may have specific advice just for you. Please talk to your care team about your preventive care needs.  Nutrition  Eat 5 or more servings of fruits and vegetables each day.  Try wheat bread, brown rice and whole grain pasta (instead of white bread, rice, and pasta).  Get enough calcium and vitamin D. Check the label on foods and aim for 100% of the RDA (recommended daily allowance).  Lifestyle  Exercise at least 150 minutes each week  (30 minutes a day, 5 days a week).  Do muscle strengthening activities 2 days a week. These help control your weight and prevent disease.  No smoking.  Wear " sunscreen to prevent skin cancer.  Have a dental exam and cleaning every 6 months.  Yearly exams  See your health care team every year to talk about:  Any changes in your health.  Any medicines your care team has prescribed.  Preventive care, family planning, and ways to prevent chronic diseases.  Shots (vaccines)   HPV shots (up to age 26), if you've never had them before.  Hepatitis B shots (up to age 59), if you've never had them before.  COVID-19 shot: Get this shot when it's due.  Flu shot: Get a flu shot every year.  Tetanus shot: Get a tetanus shot every 10 years.  Pneumococcal, hepatitis A, and RSV shots: Ask your care team if you need these based on your risk.  Shingles shot (for age 50 and up)  General health tests  Diabetes screening:  Starting at age 35, Get screened for diabetes at least every 3 years.  If you are younger than age 35, ask your care team if you should be screened for diabetes.  Cholesterol test: At age 39, start having a cholesterol test every 5 years, or more often if advised.  Bone density scan (DEXA): At age 50, ask your care team if you should have this scan for osteoporosis (brittle bones).  Hepatitis C: Get tested at least once in your life.  STIs (sexually transmitted infections)  Before age 24: Ask your care team if you should be screened for STIs.  After age 24: Get screened for STIs if you're at risk. You are at risk for STIs (including HIV) if:  You are sexually active with more than one person.  You don't use condoms every time.  You or a partner was diagnosed with a sexually transmitted infection.  If you are at risk for HIV, ask about PrEP medicine to prevent HIV.  Get tested for HIV at least once in your life, whether you are at risk for HIV or not.  Cancer screening tests  Cervical cancer screening: If you have a cervix, begin getting regular cervical cancer screening tests starting at age 21.  Breast cancer scan (mammogram): If you've ever had breasts, begin having  regular mammograms starting at age 40. This is a scan to check for breast cancer.  Colon cancer screening: It is important to start screening for colon cancer at age 45.  Have a colonoscopy test every 10 years (or more often if you're at risk) Or, ask your provider about stool tests like a FIT test every year or Cologuard test every 3 years.  To learn more about your testing options, visit:   [email or web address]/[email or web address].  For help making a decision, visit:   https://bit.Outright/zu74925.  Prostate cancer screening test: If you have a prostate, ask your care team if a prostate cancer screening test (PSA) at age 53 is right for you.  Lung cancer screening: If you are a current or former smoker ages 50 to 80, ask your care team if ongoing lung cancer screenings are right for you.  For informational purposes only. Not to replace the advice of your health care provider. Copyright   2023 [employer]. All rights reserved. Clinically reviewed by the [facility] Transitions Program. [employer] [id number] - REV 01/24.

## 2024-02-01 NOTE — PROGRESS NOTES
Preventive Care Visit  [facility]  Eliz Lamas MD, Family Medicine  Feb 1, 2024    Assessment & Plan     Encounter for Medicare annual wellness exam  Health maintenance updated, preventive services reviewed, vaccines discussed, questions are answered, patient encouraged to continue annual wellness visits to review preventive services              Counseling  Appropriate preventive services were discussed with this patient, including applicable screening as appropriate for fall prevention, nutrition, physical activity, Tobacco-use cessation, weight loss and cognition.  Checklist reviewing preventive services available has been given to the patient.  Reviewed patient's diet, addressing concerns and/or questions.   The patient was instructed to see the dentist every 6 months.   She is at risk for psychosocial distress and has been provided with information to reduce risk.   Discussed possible causes of fatigue.           Dora Shelton is a 63 year old, presenting for the following:  Physical        2/1/2024     2:03 PM   Additional Questions   Roomed by LA        Health Care Directive  Patient does not have a Health Care Directive or Living Will: Discussed advance care planning with patient; information given to patient to review.    HPI  Here for AWV    Discussed visits with Dr. Gutiérrez and rheumatology (Dr. Nice)  Joint pain discussed, patient is starting fish oil, worst in knees, not at a level that she would intervene  Elevated liver enzymes, following with Dr. Gutiérrez  Check ears, would like to be checked for ear wax  Colon cancer screening, had Cologuard last year, would like to consider colonoscopy in the future, no symptoms at this time, will continue to monitor                2/1/2024   General Health   How would you rate your overall physical health? (!) FAIR   Feel stress (tense, anxious, or unable to sleep) Only a little   (!) STRESS CONCERN      2/1/2024   Nutrition    Diet: Other   If other, please elaborate: have not figured it out yet         2/1/2024   Exercise   Days per week of moderate/strenous exercise 4 days         2/1/2024   Social Factors   Frequency of gathering with friends or relatives Once a week   Worry food won't last until get money to buy more No   Food not last or not have enough money for food? No   Do you have housing?  Yes   Are you worried about losing your housing? No   Lack of transportation? No   Unable to get utilities (heat,electricity)? No         2/1/2024   Fall Risk   Fallen 2 or more times in the past year? Yes   Trouble with walking or balance? No   Gait Speed Test (Document in seconds) 4   Gait Speed Test Interpretation Less than or equal to 5.00 seconds - PASS          2/1/2024   Activities of Daily Living- Home Safety   Needs help with the following daily activites None of the above   Safety concerns in the home None of the above         2/1/2024   Dental   Dentist two times every year? (!) NO         2/1/2024   Hearing Screening   Hearing concerns? None of the above         2/1/2024   Driving Risk Screening   Patient/family members have concerns about driving No         2/1/2024   General Alertness/Fatigue Screening   Have you been more tired than usual lately? (!) YES         2/1/2024   Urinary Incontinence Screening   Bothered by leaking urine in past 6 months No          No data to display                  Today's PHQ-2 Score:       2/1/2024     2:00 PM   PHQ-2 ( 1999 [employer])   Q1: Little interest or pleasure in doing things 0   Q2: Feeling down, depressed or hopeless 0   PHQ-2 Score 0   Q1: Little interest or pleasure in doing things Not at all   Q2: Feeling down, depressed or hopeless Not at all   PHQ-2 Score 0           2/1/2024   Substance Use   Alcohol more than 3/day or more than 7/wk No   Do you have a current opioid prescription? No   How severe/bad is pain from 1 to 10? 5/10   Do you use any other substances recreationally? No      Social History     Tobacco Use    Smoking status: Never     Passive exposure: Never    Smokeless tobacco: Never   Vaping Use    Vaping Use: Never used                History of abnormal Pap smear: NO - age 63 - see link Cervical Cytology Screening Guidelines         The 10-year ASCVD risk score (Vineet BRENNAN, et al., 2019) is: 3.8%    Values used to calculate the score:      Age: 63 years      Sex: Female      Is Non- : No      Diabetic: No      Tobacco smoker: No      Systolic Blood Pressure: 110 mmHg      Is BP treated: No      HDL Cholesterol: 87 mg/dL      Total Cholesterol: 237 mg/dL            Reviewed and updated as needed this visit by Provider   Tobacco  Allergies  Meds  Problems  Med Hx  Surg Hx  Fam Hx              Current providers sharing in care for this patient include:  Patient Care Team:  Eliz Lamas MD as PCP - General (Family Medicine)  Eliz Lamas MD as Assigned PCP    The following health maintenance items are reviewed in Epic and correct as of today:  Health Maintenance   Topic Date Due    ZOSTER IMMUNIZATION (1 of 2) Never done    RSV VACCINE (Pregnancy & 60+) (1 - 1-dose 60+ series) Never done    Pneumococcal Vaccine: 65+ Years (1 of 1 - PCV) Never done    COVID-19 Vaccine (4 - 2023-24 season) 09/01/2023    MEDICARE ANNUAL WELLNESS VISIT  02/01/2025    ANNUAL REVIEW OF HM ORDERS  02/01/2025    FALL RISK ASSESSMENT  02/01/2025    DEXA  02/15/2025    MAMMO SCREENING  03/02/2025    GLUCOSE  06/15/2025    LIPID  11/30/2025    COLORECTAL CANCER SCREENING  03/21/2026    ADVANCE CARE PLANNING  03/11/2027    DTAP/TDAP/TD IMMUNIZATION (7 - Td or Tdap) 04/17/2029    PHQ-2 (once per calendar year)  Completed    INFLUENZA VACCINE  Completed    IPV IMMUNIZATION  Aged Out    HPV IMMUNIZATION  Aged Out    MENINGITIS IMMUNIZATION  Aged Out    RSV MONOCLONAL ANTIBODY  Aged Out    HEPATITIS C SCREENING  Discontinued    HIV SCREENING  Discontinued    PAP   "Discontinued     Review of Systems       Objective    Exam  /76 (BP Location: Right arm, Patient Position: Sitting, Cuff Size: Adult Regular)   Pulse 56   Temp 97.5  F (36.4  C) (Tympanic)   Resp 12   Ht 1.619 m (5' 3.75\")   Wt 54 kg (119 lb)   LMP  (LMP Unknown)   SpO2 99%   BMI 20.59 kg/m     Estimated body mass index is 20.59 kg/m  as calculated from the following:    Height as of this encounter: 1.619 m (5' 3.75\").    Weight as of this encounter: 54 kg (119 lb).    Physical Exam  GENERAL: alert and no distress  NECK: no adenopathy, no asymmetry, masses, or scars  RESP: lungs clear to auscultation - no rales, rhonchi or wheezes  CV: regular rate and rhythm, normal S1 S2, no S3 or S4, no murmur, click or rub, no peripheral edema  ABDOMEN: soft, nontender, no hepatosplenomegaly, no masses and bowel sounds normal  MS: no gross musculoskeletal defects noted, no edema        2/1/2024   Mini Cog   Clock Draw Score 2 Normal   3 Item Recall 2 objects recalled   Mini Cog Total Score 4         Vision Screen  Patient wears corrective lenses (select all that apply): Worn during vision screen;Wears regularly  Vision Screen Results: Pass    Signed Electronically by: Eliz Lamas MD    "

## 2024-02-26 ENCOUNTER — NURSE TRIAGE (OUTPATIENT)
Dept: NURSING | Facility: CLINIC | Age: 64
End: 2024-02-26
Payer: COMMERCIAL

## 2024-02-26 NOTE — TELEPHONE ENCOUNTER
Nurse Triage SBAR    Is this a 2nd Level Triage? NO    Situation: pt calling: COV test was negative.  Pt has the sxs below.  Mild but, like a bad cold.  No fever, SOB or really runny nose.     Protocol Recommended Disposition:   Home Care    Recommendation: home care    COVID exposure in home.    Patient is negative for COVID    Date  COVID test (PCR or at home) negative 2/26/24    Current COVID symptoms: shortness of breath or difficulty breathing, fatigue, muscle or body aches, headache, sore throat, and congestion or runny nose  Date COVID symptoms began: 2/24/24     Message should be routed to clinic RN pool. Best phone number to use for call back:    Reason for Disposition   COVID-19 diagnosed by positive lab test (e.g., PCR, rapid self-test kit) and mild symptoms (e.g., cough, fever, others) and no complications or SOB    Additional Information   Negative: SEVERE difficulty breathing (e.g., struggling for each breath, speaks in single words)   Negative: Difficult to awaken or acting confused (e.g., disoriented, slurred speech)   Negative: Bluish (or gray) lips or face now   Negative: Shock suspected (e.g., cold/pale/clammy skin, too weak to stand, low BP, rapid pulse)   Negative: Sounds like a life-threatening emergency to the triager   Negative: SEVERE or constant chest pain or pressure  (Exception: Mild central chest pain, present only when coughing.)   Negative: Diagnosed or suspected COVID-19 and symptoms lasting 3 or more weeks   Negative: COVID-19 exposure and no symptoms   Negative: COVID-19 vaccine reaction suspected (e.g., fever, headache, muscle aches) occurring 1 to 3 days after getting vaccine   Negative: COVID-19 vaccine, questions about   Negative: Lives with someone known to have influenza (flu test positive) and flu-like symptoms (e.g., cough, runny nose, sore throat, SOB; with or without fever)   Negative: Possible COVID-19 symptoms and triager concerned about severity of symptoms or other  causes   Negative: COVID-19 and breastfeeding, questions about   Negative: MODERATE difficulty breathing (e.g., speaks in phrases, SOB even at rest, pulse 100-120)   Negative: Headache and stiff neck (can't touch chin to chest)   Negative: Oxygen level (e.g., pulse oximetry) 90% or lower   Negative: Chest pain or pressure  (Exception: MILD central chest pain, present only when coughing.)   Negative: Drinking very little and dehydration suspected (e.g., no urine > 12 hours, very dry mouth, very lightheaded)   Negative: Patient sounds very sick or weak to the triager   Negative: MILD difficulty breathing (e.g., minimal/no SOB at rest, SOB with walking, pulse <100)   Negative: Fever > 103 F (39.4 C)   Negative: Fever > 101 F (38.3 C) and over 60 years of age   Negative: Fever > 100.0 F (37.8 C) and bedridden (e.g., CVA, chronic illness, recovering from surgery)   Negative: HIGH RISK patient (e.g., weak immune system, age > 64 years, obesity with BMI of 30 or higher, pregnant, chronic lung disease or other chronic medical condition) and COVID symptoms (e.g., cough, fever)  (Exceptions: Already seen by doctor or NP/PA and no new or worsening symptoms.)   Negative: HIGH RISK patient and influenza is widespread in the community and ONE OR MORE respiratory symptoms: cough, sore throat, runny or stuffy nose   Negative: HIGH RISK patient and influenza exposure within the last 7 days and ONE OR MORE respiratory symptoms: cough, sore throat, runny or stuffy nose   Negative: Oxygen level (e.g., pulse oximetry) 91 to 94%   Negative: COVID-19 infection suspected by caller or triager and mild symptoms (cough, fever, or others) and negative COVID-19 rapid test   Negative: Fever present > 3 days (72 hours)   Negative: Fever returns after gone for over 24 hours and symptoms worse or not improved   Negative: Continuous (nonstop) coughing interferes with work or school and no improvement using cough treatment per Care Advice   Negative:  Cough present > 3 weeks   Negative: COVID-19 diagnosed by positive lab test (e.g., PCR, rapid self-test kit) and NO symptoms (e.g., cough, fever, others)    Protocols used: Coronavirus (COVID-19) Diagnosed or [id number]    Abbi Fautsin RN on 2/26/2024 at 9:20 AM

## 2024-02-29 NOTE — TELEPHONE ENCOUNTER
02/29/2024    Pt called and stated she tested positive for covid on 02/26/2024. Pt states her symptoms started on Saturday and Sunday 02/24 & 02/25. Pt is wondering what the quarantine guidelines are? Pt states she has an outing to go to on Saturday and wants to know if she should wear a mask?     Please call Pt to discuss her questions/concerns.    Eva Dan

## 2024-02-29 NOTE — TELEPHONE ENCOUNTER
Call with pt and discussed quarantine, home cares, retesting for covid, if breathing difficulty to go to ER. Pt asking if there are free covid tests from the government; given covidtests.gov from CDC website.

## 2024-09-03 ENCOUNTER — OFFICE VISIT (OUTPATIENT)
Dept: FAMILY MEDICINE | Facility: CLINIC | Age: 64
End: 2024-09-03
Payer: COMMERCIAL

## 2024-09-03 ENCOUNTER — ANCILLARY PROCEDURE (OUTPATIENT)
Dept: GENERAL RADIOLOGY | Facility: CLINIC | Age: 64
End: 2024-09-03
Attending: FAMILY MEDICINE
Payer: COMMERCIAL

## 2024-09-03 ENCOUNTER — NURSE TRIAGE (OUTPATIENT)
Dept: FAMILY MEDICINE | Facility: CLINIC | Age: 64
End: 2024-09-03

## 2024-09-03 VITALS
BODY MASS INDEX: 19.29 KG/M2 | HEART RATE: 67 BPM | OXYGEN SATURATION: 97 % | WEIGHT: 113 LBS | HEIGHT: 64 IN | SYSTOLIC BLOOD PRESSURE: 120 MMHG | DIASTOLIC BLOOD PRESSURE: 68 MMHG | RESPIRATION RATE: 14 BRPM | TEMPERATURE: 98.4 F

## 2024-09-03 DIAGNOSIS — M25.552 BILATERAL HIP PAIN: ICD-10-CM

## 2024-09-03 DIAGNOSIS — M25.551 BILATERAL HIP PAIN: ICD-10-CM

## 2024-09-03 DIAGNOSIS — Z23 NEED FOR TDAP VACCINATION: ICD-10-CM

## 2024-09-03 DIAGNOSIS — T14.8XXA PUNCTURE WOUND: Primary | ICD-10-CM

## 2024-09-03 PROCEDURE — 90715 TDAP VACCINE 7 YRS/> IM: CPT | Performed by: FAMILY MEDICINE

## 2024-09-03 PROCEDURE — 73522 X-RAY EXAM HIPS BI 3-4 VIEWS: CPT | Mod: TC | Performed by: RADIOLOGY

## 2024-09-03 PROCEDURE — 90471 IMMUNIZATION ADMIN: CPT | Performed by: FAMILY MEDICINE

## 2024-09-03 PROCEDURE — 99213 OFFICE O/P EST LOW 20 MIN: CPT | Mod: 25 | Performed by: FAMILY MEDICINE

## 2024-09-03 NOTE — PROGRESS NOTES
"  Assessment & Plan     Puncture wound  Puncture wound in hand no obvious foreign body right now.  Recommended warm soapy soaks a couple times a day antibiotic ointment because of the swelling that is there we will cover her with Augmentin.  Follow-up in a few days if not improving sooner if worse  - amoxicillin-clavulanate (AUGMENTIN) 875-125 MG tablet; Take 1 tablet by mouth 2 times daily for 7 days.    Bilateral hip pain  I suspect degenerative arthritis will get plain films on her let her know the results and discussed options with her which she will think about  - XR Pelvis and Hip Bilateral 2 Views; Future                  Subjective   Cat is a 64 year old, presenting for the following health issues: had a small stick break into her skin between right thumb and right index finger, painful, swollen, red, warm to touch - happened around 8pm last night she notes she thought she got a little piece of wood out but is still somewhat painful she like to have it checked out.  No numbness tingling weakness in her hand.  She notes chronic problems with  Bilateral thigh pain she notes more she does during the day the more the thighs hurt at night in the front.  Generalized pains in the knees in the calf as well but usually his thighs.  Puncture Wound        9/3/2024     1:50 PM   Additional Questions   Roomed by elizabeth vallejo   Accompanied by self     History of Present Illness       Reason for visit:  Pocked my hand  Symptom onset:  1-3 days ago  Symptoms include:  Hand hurts  Symptom intensity:  Moderate  Symptom progression:  Staying the same  Had these symptoms before:  No   She is taking medications regularly.             Review of Systems  Constitutional, HEENT, cardiovascular, pulmonary, gi and gu systems are negative, except as otherwise noted.      Objective    /68 (BP Location: Right arm, Patient Position: Sitting)   Pulse 67   Temp 98.4  F (36.9  C)   Resp 14   Ht 1.619 m (5' 3.75\")   Wt 51.3 kg (113 " lb)   LMP  (LMP Unknown)   SpO2 97%   Breastfeeding No   BMI 19.55 kg/m    Body mass index is 19.55 kg/m .  Physical Exam   Alert and no distress both ears were checked today no signs of cerumen   Today feels decreased range of motion with pain with internal rotation.  Her right hand today reveals a 1/2 cm puncture wound in the thenar area with moderate swelling.  Hand CMS is entirely intact.  No obvious foreign body seen.          Signed Electronically by: Deangelo Paz MD

## 2024-09-03 NOTE — TELEPHONE ENCOUNTER
Nurse Triage SBAR    Is this a 2nd Level Triage? NO    Situation:   Pt poked hand with a stick on 09/02/2024 evening, resulting in a puncture wound to right hand. Pt wanting to know what to do.     Background:   Pt attempted to clean hand, used hydrogen peroxide. Soaked hand in warm water. Applied hot pack.    This applied warm cloth and squeezed puncture site.       Last Tetanus 04/17/2019    Assessment:   Anterior right hand between 1st and 2nd digits.   Pt can visualize debris in puncture wound.   Swelling 1.5 x regular hand size. Erythema, warmth, and pain on posterior right hand.     Serous drainage when pt squeezed     Protocol Recommended Disposition:   See in Office Today or Tomorrow    Recommendation:   Pt scheduled for same day appointment.    Latia Bella RN     Reason for Disposition   Last tetanus shot > 5 years ago and DIRTY cut or scrape    Additional Information   Negative: Major bleeding (actively dripping or spurting) that can't be stopped   Negative: Amputation or bone sticking through the skin   Negative: Serious injury with multiple fractures (broken bones)   Negative: Sounds like a life-threatening emergency to the triager   Negative: Finger injury is main concern   Negative: Caused by an animal bite   Negative: Cast problems or questions   Negative: Wound looks infected   Negative: Hand pain from overuse (e.g., physical work, typing)   Negative: Hand pain not from an injury   Negative: Bullet, stabbed by knife or other serious penetrating wound   Negative: High pressure injection injury (e.g., from paint gun, usually work-related)   Negative: Looks like a broken bone or dislocated joint (crooked or deformed)   Negative: Skin is split open or gaping (length > 1/2 inch or 12 mm)   Negative: Bleeding won't stop after 10 minutes of direct pressure (using correct technique)   Negative: Dirt in the wound and not removed after 15 minutes of scrubbing   Negative: Numbness (loss of sensation)  of finger(s)   Negative: Sounds like a serious injury to the triager   Negative: Looks infected (e.g., spreading redness, red streak, pus)   Negative: SEVERE pain (e.g., excruciating)   Negative: Can't use injured hand normally (e.g., make a fist, open fully, hold a glass of water)   Negative: No prior tetanus shots (or is not fully vaccinated) and any wound (e.g., cut or scrape)   Negative: HIV positive or severe immunodeficiency (severely weak immune system) and DIRTY cut or scrape   Negative: Patient wants to be seen   Negative: MODERATE PAIN and high-risk adult (e.g., age > 60 years, osteoporosis, chronic steroid use)   Negative: Large swelling or bruise (> 2 inches or 5 cm)    Protocols used: Hand and Wrist Injury-A-[place]

## 2025-02-03 ENCOUNTER — OFFICE VISIT (OUTPATIENT)
Dept: FAMILY MEDICINE | Facility: CLINIC | Age: 65
End: 2025-02-03
Payer: COMMERCIAL

## 2025-02-03 ENCOUNTER — ANCILLARY PROCEDURE (OUTPATIENT)
Dept: GENERAL RADIOLOGY | Facility: CLINIC | Age: 65
End: 2025-02-03
Attending: NURSE PRACTITIONER
Payer: COMMERCIAL

## 2025-02-03 ENCOUNTER — TELEPHONE (OUTPATIENT)
Dept: FAMILY MEDICINE | Facility: CLINIC | Age: 65
End: 2025-02-03

## 2025-02-03 VITALS
HEIGHT: 64 IN | BODY MASS INDEX: 19.75 KG/M2 | DIASTOLIC BLOOD PRESSURE: 72 MMHG | SYSTOLIC BLOOD PRESSURE: 120 MMHG | TEMPERATURE: 97.7 F | HEART RATE: 69 BPM | RESPIRATION RATE: 14 BRPM | OXYGEN SATURATION: 96 % | WEIGHT: 115.7 LBS

## 2025-02-03 DIAGNOSIS — M81.0 AGE-RELATED OSTEOPOROSIS WITHOUT CURRENT PATHOLOGICAL FRACTURE: ICD-10-CM

## 2025-02-03 DIAGNOSIS — R74.01 ELEVATED AST (SGOT): ICD-10-CM

## 2025-02-03 DIAGNOSIS — M79.18 RIGHT BUTTOCK PAIN: ICD-10-CM
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
## 2025-02-03 DIAGNOSIS — M54.50 ACUTE LEFT-SIDED LOW BACK PAIN WITHOUT SCIATICA: Primary | ICD-10-CM

## 2025-02-03 PROCEDURE — 72100 X-RAY EXAM L-S SPINE 2/3 VWS: CPT | Mod: TC | Performed by: STUDENT IN AN ORGANIZED HEALTH CARE EDUCATION/TRAINING PROGRAM

## 2025-02-03 PROCEDURE — 73502 X-RAY EXAM HIP UNI 2-3 VIEWS: CPT | Mod: TC | Performed by: RADIOLOGY

## 2025-02-03 PROCEDURE — 99214 OFFICE O/P EST MOD 30 MIN: CPT | Performed by: NURSE PRACTITIONER

## 2025-02-03 RX ORDER — THERA TABS 400 MCG
1 TAB ORAL DAILY
COMMUNITY

## 2025-02-03 NOTE — TELEPHONE ENCOUNTER
S-(situation):   Patient requesting same day appointment.    B-(background):   Patient fell last Thursday. Requesting same day visit.    A-(assessment):   Patient reports she is able to ambulate and functions with ADL's.   Discomfort not improving, and requesting to be seen by provider.    R-(recommendations):   Appointment scheduled Today: Arrive at 12:40.      Patient instructed to call back if symptoms change or if new symptoms present. Patient verbalizes agreement with plan.    Serena RN, BSN  [place], [place]

## 2025-02-03 NOTE — PATIENT INSTRUCTIONS
Recommend Aleve 1 tablet twice daily X 3-4 days with food. Recommend heat therapy and topical pain relief, such as salon pas lidocaine patches, topical diclofenac or aspercream.  Avoid activities that worsen your pain.

## 2025-02-03 NOTE — TELEPHONE ENCOUNTER
Reason for Call:  Appointment Request    Patient requesting this type of appt:  pt had a fall last Thursday and thinks something happened with her pelvic bone, can walk, but still having issues    Requested provider: Eliz Lamas    Reason patient unable to be scheduled: Not within requested timeframe    When does patient want to be seen/preferred time: Same day    Comments: patient would like to be seen today, would prefer to see her PCP, but willing to see another MD    Could we send this information to you in [facility] or would you prefer to receive a phone call?:   Patient would prefer a phone call   Okay to leave a detailed message?: Yes at Cell number on file:    Telephone Information:   Mobile [phone]       Call taken on 2/3/2025 at 9:16 AM by Melissa Dockery

## 2025-02-03 NOTE — PROGRESS NOTES
Assessment & Plan     Acute left-sided low back pain without sciatica  Patient fell from horse 4 days ago, evaluated by chiropractor and shoulder blade pain improved but continues to have left-sided low back pain.  Pursued x-ray given history of osteoporosis and nature of injury.  X-ray pending but on my review without significant findings.  Recommend short course of naproxen 1 to 2 tablets twice daily for 3 to 4 days.  She prefers more natural remedies.  She has a natural anti-inflammatory supplement and discussed difficult to evaluate for interactions given the lack of knowledge of the supplement.  She could try topical treatment such as lidocaine/Salonpas patches, topical diclofenac or Aspercreme.  Recommend heat therapy 20 minutes about 4 times daily.  Recommend avoiding activities that worsen pain.  She will be informed of radiology over read of L-spine x-ray.  - XR Lumbar Spine 2/3 Views    Right buttock pain  She also has posterior right hip pain.  On my review of x-ray, there is decreased joint space on the right versus left and this is confirmed by radiologist showing moderate osteoarthrosis of the left and moderate to severe osteoarthrosis of the right hip.  No sign of fracture or dislocation.  - XR Pelvis and Hip Right 1 View    Age-related osteoporosis without current pathological fracture  X-rays pursued given history of osteoporosis.  Recommend she continue vitamin D and calcium supplement, weightbearing exercises.  She can discuss further treatment at upcoming wellness visit with PCP, Dr. Lamas.  - XR Lumbar Spine 2/3 Views  - XR Pelvis and Hip Right 1 View    Fall from horse, initial encounter  - XR Lumbar Spine 2/3 Views  - XR Pelvis and Hip Right 1 View    Elevated AST (SGOT)  Spent some time discussing her chronic mild elevation of AST.  Followed by GI in [place] and liver biopsy is scheduled this week.  She wonders if she should proceed.  Reviewed abdominal ultrasound from 2023 which showed  "benign, small hemangioma in the left hepatic lobe with normal gallbladder and liver appearance otherwise.  No further imaging completed, but GI did pursue lab work and those results were reviewed without any findings to explain her symptoms.  We did discuss the invasive nature of the liver biopsy and risk for bleeding and infection.  Discussed with Dr. Villatoro who was available for curbside consult.  I think is reasonable to delay liver biopsy and consider second opinion given the stable, mild elevation and lack of progression of symptoms.  She plans to discuss with Dr. Lamas at wellness visit next week.  Of note, she did trial a six month holiday from supplements without change in AST result. Also omeprazole and that improved garlic taste but did not resolve metallic taste, caused diarrhea stools so continue.        Patient Instructions   Recommend Aleve 1 tablet twice daily X 3-4 days with food. Recommend heat therapy and topical pain relief, such as salon pas lidocaine patches, topical diclofenac or aspercream.  Avoid activities that worsen your pain.     Subjective   Cat is a 64 year old, presenting for the following health issues:  Pain (Pain on lower left side of back and Rt side leg, Rt shoulder blade after fall off horse x 5 days), Abdominal Pain (Pt was having a bowel movement this morning and noticed a lot of pain in her lower Lt side), and Liver (Pt would like a second opinion on whether she should have a liver biopsy done without any imaging that a provider recommended)        2/3/2025    12:50 PM   Additional Questions   Roomed by ARTI Shah   Accompanied by Spouse     Cat presents today with her  for evaluation of pain after fall from horseback 4 days ago.  She was riding bareback with a Holter in her for side and bolted.  She fell onto her left side.  She has left low back pain and also posterior right hip pain radiating to posterior thigh.  Chiropractor on Friday and \"complete rib back in " "place\" and this helped her shoulder blade pain.  Neck was also adjusted by chiropractor.  They also tried to adjust her hips.  She is having trouble with muscular pain in the left low back flank area.  Twisting motion is painful.  Walking exacerbates her pain.    Hip pain starts in the right buttocks and extends to posterior thigh not extending past knee that is worse with walking.   Live biopsy on Wednesday, through GI in [place], Dr. Gutiérrez  Monitoring liver tests X a couple years, elevated AST  Was on supplements-Was told to go off X 6 months, didn't change anything  Omeprazole did help with garlic taste in her mouth that she continues to with metallic taste in her mouth.  She stopped omeprazole due to side effect of diarrhea.  Scope showed small hiatal hernia.         History of Present Illness       Reason for visit:  A fall  Symptom onset:  3-7 days ago  Symptoms include:  Pelvic pain  Symptom intensity:  Severe  Symptom progression:  Staying the same  Had these symptoms before:  No  What makes it worse:  Twisting moves  What makes it better:  Not twisting   She is taking medications regularly.                 Review of Systems  Constitutional, neuro, ENT, endocrine, pulmonary, cardiac, gastrointestinal, genitourinary, musculoskeletal, integument and psychiatric systems are negative, except as otherwise noted.  Negative for bowel or bladder changes, saddle anesthesia, fever or chills.  Positive for metallic taste.    Review of Systems    Objective    /72 (BP Location: Right arm, Patient Position: Sitting, Cuff Size: Adult Regular)   Pulse 69   Temp 97.7  F (36.5  C) (Tympanic)   Resp 14   Ht 1.619 m (5' 3.75\")   Wt 52.5 kg (115 lb 11.2 oz)   LMP  (LMP Unknown)   SpO2 96%   BMI 20.02 kg/m    Body mass index is 20.02 kg/m .  Physical Exam  Constitutional:       General: She is not in acute distress.     Appearance: Normal appearance.   HENT:      Head: Normocephalic and atraumatic.   Cardiovascular: "      Rate and Rhythm: Normal rate and regular rhythm.   Pulmonary:      Effort: Pulmonary effort is normal.      Breath sounds: Normal breath sounds.   Abdominal:      General: Abdomen is flat. Bowel sounds are normal.      Palpations: Abdomen is soft.   Musculoskeletal:      Thoracic back: Normal.      Lumbar back: Tenderness present. No swelling, deformity or bony tenderness. Negative right straight leg raise test and negative left straight leg raise test.        Back:       Right hip: No tenderness or bony tenderness. Normal range of motion.      Left hip: Normal.      Comments: Muscular tenderness left lumbar paraspinal area   Skin:     General: Skin is warm and dry.      Findings: Bruising (left low back and buttocks) present.   Neurological:      Mental Status: She is alert and oriented to person, place, and time.   Psychiatric:         Mood and Affect: Mood normal.         Behavior: Behavior normal.            Results for orders placed or performed in visit on 02/03/25   XR Lumbar Spine 2/3 Views    Impression    IMPRESSION:    1.  No radiographic evidence of acute fracture involving the lumbar spine. Vertebral body heights are maintained.  2.  Mild sigmoid curvature of the thoracolumbar spine with levocurvature at the thoracolumbar junction and apex of dextrocurvature at L4-L5. Grade 1 anterolisthesis of L3 on L4.  3.  Multilevel degenerative disc disease with generally mild disc height loss. Mild multilevel facet arthropathy.  4.  Osteopenia.  5.  Moderate bilateral hip joint osteoarthritis.     XR Pelvis and Hip Right 1 View    Impression    IMPRESSION: Degenerative changes in the spine. Mild osteoarthrosis of the SI joints. Moderate osteoarthrosis of the left hip. Moderate to severe osteoarthrosis of the right hip. There is no evidence of fracture or osteonecrosis.     XR Lumbar Spine 2/3 Views    Result Date: 2/4/2025  EXAM: XR LUMBAR SPINE 2/3 VIEWS LOCATION: [facility] DATE:  2/3/2025 INDICATION:  Acute left-sided low back pain without sciatica, Age-related osteoporosis without current pathological fracture, Fall from horse, initial encounter COMPARISON: None.     IMPRESSION: 1.  No radiographic evidence of acute fracture involving the lumbar spine. Vertebral body heights are maintained. 2.  Mild sigmoid curvature of the thoracolumbar spine with levocurvature at the thoracolumbar junction and apex of dextrocurvature at L4-L5. Grade 1 anterolisthesis of L3 on L4. 3.  Multilevel degenerative disc disease with generally mild disc height loss. Mild multilevel facet arthropathy. 4.  Osteopenia. 5.  Moderate bilateral hip joint osteoarthritis.     XR Pelvis and Hip Right 1 View    Result Date: 2/3/2025  EXAM: XR PELVIS AND HIP RIGHT 1 VIEW LOCATION: [facility] DATE: 2/3/2025 INDICATION:  Right buttock pain, Age-related osteoporosis without current pathological fracture, Fall from horse, initial encounter COMPARISON: None.     IMPRESSION: Degenerative changes in the spine. Mild osteoarthrosis of the SI joints. Moderate osteoarthrosis of the left hip. Moderate to severe osteoarthrosis of the right hip. There is no evidence of fracture or osteonecrosis.         Signed Electronically by: NNEKA Garcia CNP

## 2025-02-10 ENCOUNTER — OFFICE VISIT (OUTPATIENT)
Dept: FAMILY MEDICINE | Facility: CLINIC | Age: 65
End: 2025-02-10
Payer: COMMERCIAL

## 2025-02-10 VITALS
DIASTOLIC BLOOD PRESSURE: 82 MMHG | HEIGHT: 64 IN | RESPIRATION RATE: 18 BRPM | OXYGEN SATURATION: 99 % | SYSTOLIC BLOOD PRESSURE: 130 MMHG | WEIGHT: 117.06 LBS | TEMPERATURE: 97.1 F | HEART RATE: 66 BPM | BODY MASS INDEX: 19.99 KG/M2

## 2025-02-10 DIAGNOSIS — M16.0 PRIMARY OSTEOARTHRITIS OF BOTH HIPS: ICD-10-CM

## 2025-02-10 DIAGNOSIS — R74.01 ELEVATED AST (SGOT): ICD-10-CM

## 2025-02-10 DIAGNOSIS — M81.0 AGE-RELATED OSTEOPOROSIS WITHOUT CURRENT PATHOLOGICAL FRACTURE: ICD-10-CM

## 2025-02-10 DIAGNOSIS — Z12.31 ENCOUNTER FOR SCREENING MAMMOGRAM FOR BREAST CANCER: ICD-10-CM

## 2025-02-10 DIAGNOSIS — Z00.00 ENCOUNTER FOR MEDICARE ANNUAL WELLNESS EXAM: Primary | ICD-10-CM

## 2025-02-10 PROCEDURE — G0438 PPPS, INITIAL VISIT: HCPCS | Performed by: FAMILY MEDICINE

## 2025-02-10 SDOH — HEALTH STABILITY: PHYSICAL HEALTH: ON AVERAGE, HOW MANY MINUTES DO YOU ENGAGE IN EXERCISE AT THIS LEVEL?: 20 MIN

## 2025-02-10 SDOH — HEALTH STABILITY: PHYSICAL HEALTH: ON AVERAGE, HOW MANY DAYS PER WEEK DO YOU ENGAGE IN MODERATE TO STRENUOUS EXERCISE (LIKE A BRISK WALK)?: 4 DAYS

## 2025-02-10 ASSESSMENT — SOCIAL DETERMINANTS OF HEALTH (SDOH): HOW OFTEN DO YOU GET TOGETHER WITH FRIENDS OR RELATIVES?: ONCE A WEEK

## 2025-02-10 NOTE — PATIENT INSTRUCTIONS
You have indicated that you would like to do your routine mammogram and bone density at [facility]. Please call the facility directly to schedule your appointment.    [phone]    The hospital will send your mammogram results directly to you. If you have questions about the results, feel free to reach out to us.                Patient Education   Preventive Care Advice   This is general advice given by our system to help you stay healthy. However, your care team may have specific advice just for you. Please talk to your care team about your preventive care needs.  Nutrition  Eat 5 or more servings of fruits and vegetables each day.  Try wheat bread, brown rice and whole grain pasta (instead of white bread, rice, and pasta).  Get enough calcium and vitamin D. Check the label on foods and aim for 100% of the RDA (recommended daily allowance).  Lifestyle  Exercise at least 150 minutes each week  (30 minutes a day, 5 days a week).  Do muscle strengthening activities 2 days a week. These help control your weight and prevent disease.  No smoking.  Wear sunscreen to prevent skin cancer.  Have a dental exam and cleaning every 6 months.  Yearly exams  See your health care team every year to talk about:  Any changes in your health.  Any medicines your care team has prescribed.  Preventive care, family planning, and ways to prevent chronic diseases.  Shots (vaccines)   HPV shots (up to age 26), if you've never had them before.  Hepatitis B shots (up to age 59), if you've never had them before.  COVID-19 shot: Get this shot when it's due.  Flu shot: Get a flu shot every year.  Tetanus shot: Get a tetanus shot every 10 years.  Pneumococcal, hepatitis A, and RSV shots: Ask your care team if you need these based on your risk.  Shingles shot (for age 50 and up)  General health tests  Diabetes screening:  Starting at age 35, Get screened for diabetes at least every 3 years.  If you are younger than age 35, ask  your care team if you should be screened for diabetes.  Cholesterol test: At age 39, start having a cholesterol test every 5 years, or more often if advised.  Bone density scan (DEXA): At age 50, ask your care team if you should have this scan for osteoporosis (brittle bones).  Hepatitis C: Get tested at least once in your life.  STIs (sexually transmitted infections)  Before age 24: Ask your care team if you should be screened for STIs.  After age 24: Get screened for STIs if you're at risk. You are at risk for STIs (including HIV) if:  You are sexually active with more than one person.  You don't use condoms every time.  You or a partner was diagnosed with a sexually transmitted infection.  If you are at risk for HIV, ask about PrEP medicine to prevent HIV.  Get tested for HIV at least once in your life, whether you are at risk for HIV or not.  Cancer screening tests  Cervical cancer screening: If you have a cervix, begin getting regular cervical cancer screening tests starting at age 21.  Breast cancer scan (mammogram): If you've ever had breasts, begin having regular mammograms starting at age 40. This is a scan to check for breast cancer.  Colon cancer screening: It is important to start screening for colon cancer at age 45.  Have a colonoscopy test every 10 years (or more often if you're at risk) Or, ask your provider about stool tests like a FIT test every year or Cologuard test every 3 years.  To learn more about your testing options, visit:   .  For help making a decision, visit:   https://bit.ly/pd68110.  Prostate cancer screening test: If you have a prostate, ask your care team if a prostate cancer screening test (PSA) at age 55 is right for you.  Lung cancer screening: If you are a current or former smoker ages 50 to 80, ask your care team if ongoing lung cancer screenings are right for you.  For informational purposes only. Not to replace the advice of your health care provider. Copyright   2023  [facility]. All rights reserved. Clinically reviewed by the [facility]. [place] [id number] - REV 01/24.  Learning About Sleeping Well  What does sleeping well mean?     Sleeping well means getting enough sleep to feel good and stay healthy. How much sleep is enough varies among people.  The number of hours you sleep and how you feel when you wake up are both important. If you do not feel refreshed, you probably need more sleep. Another sign of not getting enough sleep is feeling tired during the day.  Experts recommend that adults get at least 7 or more hours of sleep per day. Children and older adults need more sleep.  Why is getting enough sleep important?  Getting enough quality sleep is a basic part of good health. When your sleep suffers, your physical health, mood, and your thoughts can suffer too. You may find yourself feeling more grumpy or stressed. Not getting enough sleep also can lead to serious problems, including injury, accidents, anxiety, and depression.  What might cause poor sleeping?  Many things can cause sleep problems, including:  Changes to your sleep schedule.  Stress. Stress can be caused by fear about a single event, such as giving a speech. Or you may have ongoing stress, such as worry about work or school.  Depression, anxiety, and other mental or emotional conditions.  Changes in your sleep habits or surroundings. This includes changes that happen where you sleep, such as noise, light, or sleeping in a different bed. It also includes changes in your sleep pattern, such as having jet lag or working a late shift.  Health problems, such as pain, breathing problems, and restless legs syndrome.  Lack of regular exercise.  Using alcohol, nicotine, or caffeine before bed.  How can you help yourself?  Here are some tips that may help you sleep more soundly and wake up feeling more refreshed.  Your sleeping area   Use your bedroom only for sleeping and sex.  "A bit of light reading may help you fall asleep. But if it doesn't, do your reading elsewhere in the house. Try not to use your TV, computer, smartphone, or tablet while you are in bed.  Be sure your bed is big enough to stretch out comfortably, especially if you have a sleep partner.  Keep your bedroom quiet, dark, and cool. Use curtains, blinds, or a sleep mask to block out light. To block out noise, use earplugs, soothing music, or a \"white noise\" machine.  Your evening and bedtime routine   Create a relaxing bedtime routine. You might want to take a warm shower or bath, or listen to soothing music.  Go to bed at the same time every night. And get up at the same time every morning, even if you feel tired.  What to avoid   Limit caffeine (coffee, tea, caffeinated sodas) during the day, and don't have any for at least 6 hours before bedtime.  Avoid drinking alcohol before bedtime. Alcohol can cause you to wake up more often during the night.  Try not to smoke or use tobacco, especially in the evening. Nicotine can keep you awake.  Limit naps during the day, especially close to bedtime.  Avoid lying in bed awake for too long. If you can't fall asleep or if you wake up in the middle of the night and can't get back to sleep within about 20 minutes, get out of bed and go to another room until you feel sleepy.  Avoid taking medicine right before bed that may keep you awake or make you feel hyper or energized. Your doctor can tell you if your medicine may do this and if you can take it earlier in the day.  If you can't sleep   Imagine yourself in a peaceful, pleasant scene. Focus on the details and feelings of being in a place that is relaxing.  Get up and do a quiet or boring activity until you feel sleepy.  Avoid drinking any liquids before going to bed to help prevent waking up often to use the bathroom.  Where can you learn more?  Go to https://www.[employer].net/patiented  Enter J942 in the search box to learn more " "about \"Learning About Sleeping Well.\"  Current as of: July 31, 2024  Content Version: 14.3    2024 [employer].   Care instructions adapted under license by your healthcare professional. If you have questions about a medical condition or this instruction, always ask your healthcare professional. [employer] disclaims any warranty or liability for your use of this information.       "

## 2025-02-10 NOTE — PROGRESS NOTES
Preventive Care Visit  [facility]  Eliz Lamas MD, Family Medicine  Feb 10, 2025      Assessment & Plan     Encounter for Medicare annual wellness exam  Health maintenance updated, preventive services reviewed, vaccines discussed, questions are answered, patient encouraged to continue annual wellness visits to review preventive services    Encounter for screening mammogram for breast cancer  Referred for mammogram  - MA Screening Bilateral w/ Tim[place]o; Future    Age-related osteoporosis without current pathological fracture  Due for DEXA scan, [facility]  - DEXA HIP/PELVIS/SPINE - Future; Future    Elevated AST (SGOT)  Under care of Dr. Gutiérrez, discussed liver biopsy, encouraged her to follow up with him about her questions.     Primary osteoarthritis of both hips  Discussed next steps, recommend monitoring activity, consider referral to ortho if worsening            Counseling  Appropriate preventive services were addressed with this patient via screening, questionnaire, or discussion as appropriate for fall prevention, nutrition, physical activity, Tobacco-use cessation, social engagement, weight loss and cognition.  Checklist reviewing preventive services available has been given to the patient.  Reviewed patient's diet, addressing concerns and/or questions.   The patient was instructed to see the dentist every 6 months.   Discussed possible causes of fatigue.         Dora Shelton is a 64 year old, presenting for the following:  Annual Visit        2/10/2025     2:27 PM   Additional Questions   Roomed by Griselda SYED, reviewed history  Liver follow up, liver biopsy recommended, patient with questions, discussed procedure in more details  Osteoporosis follow up discussed  Discussed supplements  Reviewed imaging for recent fall. Has hip arthritis bilaterally. Has been documented previously. Current pain is lateral and posterior. Reviewed prior  iron supplements which led to higher levels than needed          Health Care Directive  Patient does not have a Health Care Directive: Discussed advance care planning with patient; however, patient declined at this time.      2/10/2025   General Health   How would you rate your overall physical health? Good   Feel stress (tense, anxious, or unable to sleep) Not at all         2/10/2025   Nutrition   Diet: Regular (no restrictions)    I don't know       Multiple values from one day are sorted in reverse-chronological order         2/10/2025   Exercise   Days per week of moderate/strenous exercise 4 days   Average minutes spent exercising at this level 20 min         2/10/2025   Social Factors   Frequency of gathering with friends or relatives Once a week   Worry food won't last until get money to buy more No   Food not last or not have enough money for food? No   Do you have housing? (Housing is defined as stable permanent housing and does not include staying ouside in a car, in a tent, in an abandoned building, in an overnight shelter, or couch-surfing.) Yes   Are you worried about losing your housing? No   Lack of transportation? No   Unable to get utilities (heat,electricity)? No         2/10/2025   Fall Risk   Fallen 2 or more times in the past year? No   Trouble with walking or balance? No          2/10/2025   Activities of Daily Living- Home Safety   Needs help with the following daily activites None of the above   Safety concerns in the home None of the above         2/10/2025   Dental   Dentist two times every year? (!) NO         2/10/2025   Hearing Screening   Hearing concerns? None of the above         2/10/2025   Driving Risk Screening   Patient/family members have concerns about driving No         2/10/2025   General Alertness/Fatigue Screening   Have you been more tired than usual lately? (!) YES         2/10/2025   Urinary Incontinence Screening   Bothered by leaking urine in past 6 months No             Today's PHQ-2 Score:       2/10/2025     2:21 PM   PHQ-2 ( 1999 Pf[employer])   Q1: Little interest or pleasure in doing things 0   Q2: Feeling down, depressed or hopeless 0   PHQ-2 Score 0    Q1: Little interest or pleasure in doing things Not at all   Q2: Feeling down, depressed or hopeless Not at all   PHQ-2 Score 0       Patient-reported           2/10/2025   Substance Use   Alcohol more than 3/day or more than 7/wk No   Do you have a current opioid prescription? No   How severe/bad is pain from 1 to 10? 2/10   Do you use any other substances recreationally? No     Social History     Tobacco Use    Smoking status: Never     Passive exposure: Never    Smokeless tobacco: Never   Vaping Use    Vaping status: Never Used                History of abnormal Pap smear: No - age 65 or older with adequate negative prior screening test results (3 consecutive negative cytology results, 2 consecutive negative cotesting results, or 2 consecutive negative HrHPV test results within 10 years, with the most recent test occurring within the recommended screening interval for the test used)       ASCVD Risk   The 10-year ASCVD risk score (Vineet BRENNAN, et al., 2019) is: 5.8%    Values used to calculate the score:      Age: 64 years      Sex: Female      Is Non- : No      Diabetic: No      Tobacco smoker: No      Systolic Blood Pressure: 130 mmHg      Is BP treated: No      HDL Cholesterol: 87 mg/dL      Total Cholesterol: 237 mg/dL            Reviewed and updated as needed this visit by Provider   Tobacco  Allergies  Meds  Problems  Med Hx  Surg Hx  Fam Hx     Sexual Activity            Current providers sharing in care for this patient include:  Patient Care Team:  Eliz Lamas MD as PCP - General (Family Medicine)  Eliz Lamas MD as Assigned PCP    The following health maintenance items are reviewed in Epic and correct as of today:  Health Maintenance   Topic Date Due     "Pneumococcal Vaccine: 50+ Years (1 of 1 - PCV) Never done    ZOSTER IMMUNIZATION (1 of 2) Never done    DEXA  02/15/2025    MAMMO SCREENING  03/02/2025    COVID-19 Vaccine (5 - 2024-25 season) 05/19/2025    GLUCOSE  06/15/2025    LIPID  11/30/2025    MEDICARE ANNUAL WELLNESS VISIT  02/10/2026    ANNUAL REVIEW OF HM ORDERS  02/10/2026    FALL RISK ASSESSMENT  02/10/2026    COLORECTAL CANCER SCREENING  03/21/2026    ADVANCE CARE PLANNING  02/10/2030    RSV VACCINE (1 - 1-dose 75+ series) 04/12/2033    DTAP/TDAP/TD IMMUNIZATION (8 - Td or Tdap) 09/03/2034    PHQ-2 (once per calendar year)  Completed    INFLUENZA VACCINE  Completed    HPV IMMUNIZATION  Aged Out    MENINGITIS IMMUNIZATION  Aged Out    HEPATITIS C SCREENING  Discontinued    PAP  Discontinued            Objective    Exam  /82   Pulse 66   Temp 97.1  F (36.2  C)   Resp 18   Ht 1.619 m (5' 3.74\")   Wt 53.1 kg (117 lb 1 oz)   LMP  (LMP Unknown)   SpO2 99%   BMI 20.26 kg/m     Estimated body mass index is 20.26 kg/m  as calculated from the following:    Height as of this encounter: 1.619 m (5' 3.74\").    Weight as of this encounter: 53.1 kg (117 lb 1 oz).    Physical Exam  GENERAL: alert and no distress  EYES: Eyes grossly normal to inspection, PERRL and conjunctivae and sclerae normal  HENT: ear canals and TM's normal, nose and mouth without ulcers or lesions  NECK: no adenopathy, no asymmetry, masses, or scars  RESP: lungs clear to auscultation - no rales, rhonchi or wheezes  CV: regular rate and rhythm, normal S1 S2, no S3 or S4, no murmur, click or rub, no peripheral edema  ABDOMEN: soft, nontender, no hepatosplenomegaly, no masses and bowel sounds normal  MS: no gross musculoskeletal defects noted, no edema  SKIN: no suspicious lesions or rashes  NEURO: Normal strength and tone, mentation intact and speech normal  PSYCH: mentation appears normal, affect normal/bright        2/10/2025   Mini Cog   Clock Draw Score 2 Normal   3 Item Recall 3 " objects recalled   Mini Cog Total Score 5         Vision Screen  Reason Vision Screen Not Completed: Screening Recommend: Patient/Guardian Declined (eye exam in the last year)      Signed Electronically by: Eliz Lamas MD

## 2025-02-18 ENCOUNTER — MYC MEDICAL ADVICE (OUTPATIENT)
Dept: FAMILY MEDICINE | Facility: CLINIC | Age: 65
End: 2025-02-18
Payer: COMMERCIAL

## 2025-03-06 ENCOUNTER — OFFICE VISIT (OUTPATIENT)
Dept: FAMILY MEDICINE | Facility: CLINIC | Age: 65
End: 2025-03-06
Payer: COMMERCIAL

## 2025-03-06 VITALS
TEMPERATURE: 97.8 F | SYSTOLIC BLOOD PRESSURE: 137 MMHG | WEIGHT: 118.4 LBS | DIASTOLIC BLOOD PRESSURE: 82 MMHG | HEART RATE: 71 BPM | OXYGEN SATURATION: 99 % | HEIGHT: 64 IN | BODY MASS INDEX: 20.22 KG/M2 | RESPIRATION RATE: 16 BRPM

## 2025-03-06 NOTE — PROGRESS NOTES
Preoperative Evaluation  [facility]  [address] [address]  Phone: [phone]  Fax: [phone]  Primary Provider: Eliz Lamas MD  Pre-op Performing Provider: Julio Ku MD  Mar 6, 2025   {Provider  Link to PREOP SmartSet  REQUIRED to apply standard patient instructions and medication directions to the AVS :990564}  {ROOMER review and update patient entered surgical information if needed :666717}        3/6/2025   Surgical Information   What procedure is being done? preop   Facility or Hospital where procedure/surgery will be performed: [facility]   Who is doing the procedure / surgery? Dr Gutiérrez   Date of surgery / procedure: march 11   Time of surgery / procedure: 12:00   Where do you plan to recover after surgery? at home with family     Fax number for surgical facility: {:[phone]}    {Provider Charting Preference for Preop :867025}    Dora Shelton is a 64 year old, presenting for the following:  Pre-Op Exam (Liver biopsy, 03/11/2025)          3/6/2025     4:10 PM   Additional Questions   Roomed by ARTI Russell     HPI: ***          3/6/2025   Pre-Op Questionnaire   Have you ever had a heart attack or stroke? No   Have you ever had surgery on your heart or blood vessels, such as a stent placement, a coronary artery bypass, or surgery on an artery in your head, neck, heart, or legs? No   Do you have chest pain with activity? No   Do you have a history of heart failure? (!) UNKNOWN ***   Do you currently have a cold, bronchitis or symptoms of other infection? No   Do you have a cough, shortness of breath, or wheezing? No   Do you or anyone in your family have previous history of blood clots? (!) YES ***   Do you or does anyone in your family have a serious bleeding problem such as prolonged bleeding following surgeries or cuts? No   Have you ever had problems with anemia or been told to take iron pills? No   Have you had any  abnormal blood loss such as black, tarry or bloody stools, or abnormal vaginal bleeding? No   Have you ever had a blood transfusion? No   Are you willing to have a blood transfusion if it is medically needed before, during, or after your surgery? Yes   Have you or any of your relatives ever had problems with anesthesia? No   Do you have sleep apnea, excessive snoring or daytime drowsiness? No   Do you have any artifical heart valves or other implanted medical devices like a pacemaker, defibrillator, or continuous glucose monitor? No   Do you have artificial joints? No   Are you allergic to latex? No     Health Care Directive  Patient does not have a Health Care Directive: {ADVANCE_DIRECTIVE_STATUS:649164}    Preoperative Review of   {Mnpmpreport:003356}  {Review MNPMP for all patients per ICSI MNPMP Profile:094712}    {Chronic problem details (Optional) :780095}    Patient Active Problem List    Diagnosis Date Noted    Elevated AST (SGOT) 02/10/2025     Priority: Medium    Age-related osteoporosis without current pathological fracture 02/28/2022     Priority: Medium      No past medical history on file.  Past Surgical History:   Procedure Laterality Date    IR LYMPH NODE BIOPSY  12/4/2020     Current Outpatient Medications   Medication Sig Dispense Refill    calcium citrate 100 mg/mL suspension Take by mouth daily      latanoprost (XALATAN) 0.005 % ophthalmic solution INSTILL 1 DROP IN BOTH EYES EVERY DAY AT BEDTIME      multivitamin, therapeutic (THERA-VIT) TABS tablet Take 1 tablet by mouth daily.      timolol maleate (TIMOPTIC) 0.5 % ophthalmic solution INSTILL 1 DROP IN BOTH EYES EVERY MORNING         No Known Allergies     Social History     Tobacco Use    Smoking status: Never     Passive exposure: Never    Smokeless tobacco: Never   Substance Use Topics    Alcohol use: Not on file     {FAMILY HISTORY (Optional):951893512}  History   Drug Use Not on file           {ROS Picklists  "(Optional):639721}    Objective    /82   Pulse 71   Temp 97.8  F (36.6  C) (Oral)   Resp 16   Ht 1.619 m (5' 3.74\")   Wt 53.7 kg (118 lb 6.4 oz)   LMP  (LMP Unknown)   SpO2 99%   BMI 20.49 kg/m     Estimated body mass index is 20.49 kg/m  as calculated from the following:    Height as of this encounter: 1.619 m (5' 3.74\").    Weight as of this encounter: 53.7 kg (118 lb 6.4 oz).  Physical Exam  {Exam List :904866}    No results for input(s): \"HGB\", \"PLT\", \"INR\", \"NA\", \"POTASSIUM\", \"CR\", \"A1C\" in the last 8760 hours.     Diagnostics  {LABS:764118}   {EK}    Revised Cardiac Risk Index (RCRI)  The patient has the following serious cardiovascular risks for perioperative complications:  {PREOP REVISED CARDIAC RISK INDEX (RCRI) :757711}     RCRI Interpretation: {REVISED CARDIAC RISK INTERPRETATION :843003}         Signed Electronically by: Julio Ku MD  A copy of this evaluation report is provided to the requesting physician.    {Provider Resources  Preop [facility] Preop Guidelines  Revised Cardiac Risk Index :349734}   {Email feedback regarding this note to [email or web address]   :130771}  "

## 2025-03-06 NOTE — PATIENT INSTRUCTIONS
Thank you for visiting us today. Here is a summary of my recommendations based on what we discussed:    Except for your eyedrops none of the other medications should be taking morning of surgery.  With the fish oil supplement I would recommend stopping that about 5 days before as it may thin your blood slightly.  I reviewed with multiple labs and other than the elevated liver enzymes did not see any other problems for which we need to repeat labs prior to your procedure.  Recommend fasting for 8 hours, usually clear liquids are okay up to 2 hours before the procedure but make sure to follow the specific surgical suite recommendations as they may vary a bit.      Please let us know if you have any questions via ADOR or you can call us too.      Brijesh (Julio Ku MD)

## 2025-03-07 PROBLEM — Z01.818 PREOPERATIVE EXAMINATION: Status: ACTIVE | Noted: 2025-03-07

## 2025-03-12 ENCOUNTER — DOCUMENTATION ONLY (OUTPATIENT)
Dept: FAMILY MEDICINE | Facility: CLINIC | Age: 65
End: 2025-03-12
Payer: COMMERCIAL

## 2025-03-12 ENCOUNTER — TELEPHONE (OUTPATIENT)
Dept: FAMILY MEDICINE | Facility: CLINIC | Age: 65
End: 2025-03-12
Payer: COMMERCIAL

## 2025-03-12 DIAGNOSIS — M81.0 AGE-RELATED OSTEOPOROSIS WITHOUT CURRENT PATHOLOGICAL FRACTURE: ICD-10-CM

## 2025-03-12 DIAGNOSIS — R74.01 ELEVATED AST (SGOT): Primary | ICD-10-CM

## 2025-03-12 NOTE — TELEPHONE ENCOUNTER
Please let patient know that Medicare does not cover nutrition consultations except for diabetes and chronic kidney disease.  I am happy to place the referral but she would need to pay for the visits out-of-pocket.

## 2025-03-12 NOTE — TELEPHONE ENCOUNTER
S-(situation):   Patient calling with questions about letter sent.    B-(background):   3/10/2025  Letter with DEXA results.    A-(assessment):   Confirmed with patient she is taking:  Citracal: 1200 plus D3 1,000 international units.  Daily Vitamin: 300 mg calcium    Patient confirmed she is walking dog routinely and chores.    R-(recommendations):   Patient is requesting referral for Dietician. Patient feels she needs (weekly/monthly) coaching for healthy diet.        Serena Holder RN, BSN  [facility] Triage

## 2025-03-12 NOTE — TELEPHONE ENCOUNTER
Call with pt and given provider's message. Pt will explore alternative options for dietician and will call if questions.

## 2025-03-17 ENCOUNTER — NURSE TRIAGE (OUTPATIENT)
Dept: FAMILY MEDICINE | Facility: CLINIC | Age: 65
End: 2025-03-17
Payer: COMMERCIAL

## 2025-03-17 NOTE — TELEPHONE ENCOUNTER
S-(situation): Cold like symptoms x 1 week.     B-(background): Patient complains of cold like symptoms that started last week. Continues to feel sick and only slowly improving. She did not take an at home Covid test.     A-(assessment): Cough, congestion, runny nose, sore throat the first three days, green mucus when she blows her nose. Feeling run down and tired. Denies any fever, sob, chest pain, difficulty breathing.    R-(recommendations): Home cares reviewed and patient will continue to monitor at home. If she develops new or worsening symptoms, she will call back to schedule an appointment in clinic. ED advised for difficulty breathing, shortness of breath, or chest pain.       Reason for Disposition   Colds with no complications    Additional Information   Negative: SEVERE difficulty breathing (e.g., struggling for each breath, speaks in single words)   Negative: Very weak (can't stand)   Negative: Sounds like a life-threatening emergency to the triager   Negative: Symptoms of COVID-19 (e.g., cough, fever, SOB, or others) and COVID-19 is widespread in the community   Negative: Symptoms of COVID-19 (e.g., cough, fever, SOB, or others) and within 14 days of COVID-19 EXPOSURE   Negative: Symptoms of FLU (e.g., cough, runny nose, SOB, sore throat; with or without fever) and within 14 days of EXPOSURE (close contact) with someone diagnosed with influenza (e.g., flu test positive)   Negative: Difficulty breathing and not from stuffy nose (e.g., not relieved by cleaning out the nose)   Negative: Runny nose is caused by pollen or other allergies   Negative: Cough is main symptom   Negative: Sore throat is main symptom   Negative: Patient sounds very sick or weak to the triager   Negative: Fever > 103 F (39.4 C)   Negative: Fever > 101 F (38.3 C) and over 60 years of age   Negative: Fever > 100 F (37.8 C) and has diabetes mellitus or a weak immune system (e.g., HIV positive, cancer chemotherapy, organ transplant,  splenectomy, chronic steroids)   Negative: Fever > 100 F (37.8 C) and bedridden (e.g., CVA, chronic illness, recovering from surgery)   Negative: Fever present > 3 days (72 hours)   Negative: Fever returns after gone for over 24 hours and symptoms worse or not improved   Negative: Sinus pain (not just congestion) and fever   Negative: Earache   Negative: Sinus congestion (pressure, fullness) present > 10 days   Negative: Nasal discharge present > 10 days   Negative: Using nasal washes and pain medicine > 24 hours and sinus pain (lower forehead, cheekbone, or eye) persists   Negative: Patient wants to be seen   Negative: Sore throat present > 5 days    Protocols used: Common Cold-A-[place]

## 2025-03-26 ENCOUNTER — OFFICE VISIT (OUTPATIENT)
Dept: FAMILY MEDICINE | Facility: CLINIC | Age: 65
End: 2025-03-26
Payer: COMMERCIAL

## 2025-03-26 VITALS
BODY MASS INDEX: 19.87 KG/M2 | HEART RATE: 62 BPM | TEMPERATURE: 97.8 F | WEIGHT: 116.4 LBS | HEIGHT: 64 IN | RESPIRATION RATE: 10 BRPM | DIASTOLIC BLOOD PRESSURE: 84 MMHG | OXYGEN SATURATION: 99 % | SYSTOLIC BLOOD PRESSURE: 132 MMHG

## 2025-03-26 DIAGNOSIS — M81.0 AGE-RELATED OSTEOPOROSIS WITHOUT CURRENT PATHOLOGICAL FRACTURE: Primary | ICD-10-CM

## 2025-03-26 DIAGNOSIS — J01.01 ACUTE RECURRENT MAXILLARY SINUSITIS: ICD-10-CM

## 2025-03-26 DIAGNOSIS — M15.0 PRIMARY OSTEOARTHRITIS INVOLVING MULTIPLE JOINTS: ICD-10-CM

## 2025-03-26 PROCEDURE — 99214 OFFICE O/P EST MOD 30 MIN: CPT | Performed by: FAMILY MEDICINE

## 2025-03-26 PROCEDURE — 3079F DIAST BP 80-89 MM HG: CPT | Performed by: FAMILY MEDICINE

## 2025-03-26 PROCEDURE — 3075F SYST BP GE 130 - 139MM HG: CPT | Performed by: FAMILY MEDICINE

## 2025-03-26 PROCEDURE — G2211 COMPLEX E/M VISIT ADD ON: HCPCS | Performed by: FAMILY MEDICINE

## 2025-05-18 ENCOUNTER — HEALTH MAINTENANCE LETTER (OUTPATIENT)
Age: 65
End: 2025-05-18

## 2025-06-19 NOTE — PROGRESS NOTES
Assessment & Plan     Age-related osteoporosis without current pathological fracture:  - Osteoporosis primarily in the spine, with T-scores indicating osteoporosis at L1 and osteopenia in the hip. No prior studies available for comparison due to a different machine.  - Consideration of treatment due to spine osteoporosis. Discussed the potential use of Vitamin K2, which may help with calcium and vitamin D absorption, though not widely recommended due to inconsistent study findings. Recommended weight-bearing exercises and maintaining current calcium and vitamin D intake. Follow-up DEXA scan scheduled for two years from March 2025.  - Risks and side effects: Discussed potential side effects of osteoporosis medications, including femur fractures if used for more than 10 years.    Primary osteoarthritis involving multiple joints:  - Osteoarthritis likely contributing to joint pain, particularly in the knees. Pain may also be related to muscle soreness or inflammation.  - Continue current regimen of glucosamine, fish oil, and natural anti-inflammatory supplements. Consider weight-bearing exercises and muscle strengthening activities. Monitor pain and adjust activity levels as needed.    Acute recurrent maxillary sinusitis:  - Symptoms suggestive of sinusitis, but not confirmed as bacterial. Lungs are clear, and no signs of pneumonia.  However given that symptoms been going on for 2 weeks it is reasonable to treat with antibiotics  - Consider over-the-counter medications for symptom relief. Discussed the option of prescribing antibiotics but not convinced of bacterial infection. Patient to decide on further action if symptoms persist.  - Risks and side effects: Discussed potential for yeast infection with antibiotic use.    Consent was obtained from the patient to use an AI documentation tool in the creation of this note.      The longitudinal plan of care for the diagnosis(es)/condition(s) as documented were addressed  Return to the Emergency Department for thoughts of hurting yourself or others or for any new medical problems or complaints.  Follow instructions of [facility] and follow-up as planned.   "during this visit. Due to the added complexity in care, I will continue to support Cat in the subsequent management and with ongoing continuity of care.                Subjective   Cat is a 64 year old, presenting for the following health issues:  URI (Cough, Fatigue, Nasal Congestion/Drainage - X15 days) and Arthritis (Diagnosed Recently - Wanting to know what she can do. )        3/26/2025     1:20 PM   Additional Questions   Roomed by Ml CHRISTIANSON CMA   Accompanied by None     History of Present Illness-  Cat Bauman, 64 years    Fatigue and Cough  - Significant fatigue and cough developed after initial tiredness.  - Cough produces green phlegm, primarily in the morning.  - Symptoms started a little more than two weeks ago.  - No fever, but chills present.  - Muscle aches not significant.    Arthritis  - Pain primarily localized to the knees, sometimes radiating down the back.  - Pain occurs at rest, especially at night, but has improved recently.  - Taking natural anti-inflammatory supplements, including glucosamine and fish oil, which may be helping.  - Pain has been present for some time, with recent improvement.    Osteoporosis  - Previous diagnosis of osteoporosis, with recent tests showing spine in osteoporosis range and hip in osteopenia range.  - No prior studies available for comparison on the current scanner.  - Taking calcium and vitamin D supplements for the past three years, but osteoporosis has still progressed.                  Objective    /84   Pulse 62   Temp 97.8  F (36.6  C)   Resp 10   Ht 1.619 m (5' 3.74\")   Wt 52.8 kg (116 lb 6.4 oz)   LMP  (LMP Unknown)   SpO2 99%   BMI 20.14 kg/m    Body mass index is 20.14 kg/m .  Physical Exam   - HEENT: Oropharynx clear, Tms normal, minimal tenderness over maxillary sinuses  - CARDIOVASCULAR: Heart rhythm regular.  - LUNGS: Breath sounds clear.            Signed Electronically by: Eliz Lamas MD    "